# Patient Record
Sex: FEMALE | Race: OTHER
[De-identification: names, ages, dates, MRNs, and addresses within clinical notes are randomized per-mention and may not be internally consistent; named-entity substitution may affect disease eponyms.]

---

## 2017-09-29 ENCOUNTER — HOSPITAL ENCOUNTER (EMERGENCY)
Dept: HOSPITAL 8 - ED | Age: 42
Discharge: HOME | End: 2017-09-29
Payer: MEDICAID

## 2017-09-29 VITALS — HEIGHT: 58 IN | BODY MASS INDEX: 23.14 KG/M2 | WEIGHT: 110.23 LBS

## 2017-09-29 VITALS — DIASTOLIC BLOOD PRESSURE: 63 MMHG | SYSTOLIC BLOOD PRESSURE: 119 MMHG

## 2017-09-29 DIAGNOSIS — I10: ICD-10-CM

## 2017-09-29 DIAGNOSIS — M94.0: Primary | ICD-10-CM

## 2017-09-29 LAB
AST SERPL-CCNC: 17 U/L (ref 15–37)
BUN SERPL-MCNC: 10 MG/DL (ref 7–18)
HCT VFR BLD CALC: 43.9 % (ref 34.6–47.8)
HGB BLD-MCNC: 14.9 G/DL (ref 11.7–16.4)
IS PT STATUS REG ER OR PRE ER?: YES
WBC # BLD AUTO: 6.9 X10^3/UL (ref 3.4–10)

## 2017-09-29 PROCEDURE — 36415 COLL VENOUS BLD VENIPUNCTURE: CPT

## 2017-09-29 PROCEDURE — 84484 ASSAY OF TROPONIN QUANT: CPT

## 2017-09-29 PROCEDURE — 85379 FIBRIN DEGRADATION QUANT: CPT

## 2017-09-29 PROCEDURE — 80053 COMPREHEN METABOLIC PANEL: CPT

## 2017-09-29 PROCEDURE — 93005 ELECTROCARDIOGRAM TRACING: CPT

## 2017-09-29 PROCEDURE — 71010: CPT

## 2017-09-29 PROCEDURE — 99285 EMERGENCY DEPT VISIT HI MDM: CPT

## 2017-09-29 PROCEDURE — 85025 COMPLETE CBC W/AUTO DIFF WBC: CPT

## 2018-08-20 ENCOUNTER — HOSPITAL ENCOUNTER (EMERGENCY)
Dept: HOSPITAL 8 - ED | Age: 43
Discharge: HOME | End: 2018-08-20
Payer: MEDICAID

## 2018-08-20 VITALS — SYSTOLIC BLOOD PRESSURE: 137 MMHG | DIASTOLIC BLOOD PRESSURE: 81 MMHG

## 2018-08-20 VITALS — BODY MASS INDEX: 22.21 KG/M2 | HEIGHT: 58 IN | WEIGHT: 105.82 LBS

## 2018-08-20 DIAGNOSIS — I10: ICD-10-CM

## 2018-08-20 DIAGNOSIS — R51: Primary | ICD-10-CM

## 2018-08-20 DIAGNOSIS — E05.00: ICD-10-CM

## 2018-08-20 LAB
ALBUMIN SERPL-MCNC: 3.4 G/DL (ref 3.4–5)
ALP SERPL-CCNC: 53 U/L (ref 45–117)
ALT SERPL-CCNC: 29 U/L (ref 12–78)
ANION GAP SERPL CALC-SCNC: 10 MMOL/L (ref 5–15)
BASOPHILS # BLD AUTO: 0.03 X10^3/UL (ref 0–0.1)
BASOPHILS NFR BLD AUTO: 0 % (ref 0–1)
BILIRUB SERPL-MCNC: 0.3 MG/DL (ref 0.2–1)
CALCIUM SERPL-MCNC: 8.2 MG/DL (ref 8.5–10.1)
CHLORIDE SERPL-SCNC: 103 MMOL/L (ref 98–107)
CREAT SERPL-MCNC: 0.74 MG/DL (ref 0.55–1.02)
CULTURE INDICATED?: NO
EOSINOPHIL # BLD AUTO: 0.36 X10^3/UL (ref 0–0.4)
EOSINOPHIL NFR BLD AUTO: 3 % (ref 1–7)
ERYTHROCYTE [DISTWIDTH] IN BLOOD BY AUTOMATED COUNT: 13 % (ref 9.6–15.2)
LYMPHOCYTES # BLD AUTO: 2 X10^3/UL (ref 1–3.4)
LYMPHOCYTES NFR BLD AUTO: 16 % (ref 22–44)
MCH RBC QN AUTO: 32.3 PG (ref 27–34.8)
MCHC RBC AUTO-ENTMCNC: 34.8 G/DL (ref 32.4–35.8)
MCV RBC AUTO: 92.9 FL (ref 80–100)
MD: NO
MICROSCOPIC: (no result)
MONOCYTES # BLD AUTO: 0.96 X10^3/UL (ref 0.2–0.8)
MONOCYTES NFR BLD AUTO: 8 % (ref 2–9)
NEUTROPHILS # BLD AUTO: 9.12 X10^3/UL (ref 1.8–6.8)
NEUTROPHILS NFR BLD AUTO: 73 % (ref 42–75)
PLATELET # BLD AUTO: 307 X10^3/UL (ref 130–400)
PMV BLD AUTO: 6.9 FL (ref 7.4–10.4)
PROT SERPL-MCNC: 7.2 G/DL (ref 6.4–8.2)
RBC # BLD AUTO: 4.73 X10^6/UL (ref 3.82–5.3)
TROPONIN I SERPL-MCNC: < 0.015 NG/ML (ref 0–0.04)

## 2018-08-20 PROCEDURE — 81001 URINALYSIS AUTO W/SCOPE: CPT

## 2018-08-20 PROCEDURE — 85025 COMPLETE CBC W/AUTO DIFF WBC: CPT

## 2018-08-20 PROCEDURE — 80053 COMPREHEN METABOLIC PANEL: CPT

## 2018-08-20 PROCEDURE — 99285 EMERGENCY DEPT VISIT HI MDM: CPT

## 2018-08-20 PROCEDURE — 70450 CT HEAD/BRAIN W/O DYE: CPT

## 2018-08-20 PROCEDURE — 96375 TX/PRO/DX INJ NEW DRUG ADDON: CPT

## 2018-08-20 PROCEDURE — 93005 ELECTROCARDIOGRAM TRACING: CPT

## 2018-08-20 PROCEDURE — 71045 X-RAY EXAM CHEST 1 VIEW: CPT

## 2018-08-20 PROCEDURE — 96374 THER/PROPH/DIAG INJ IV PUSH: CPT

## 2018-08-20 PROCEDURE — 36415 COLL VENOUS BLD VENIPUNCTURE: CPT

## 2018-08-20 PROCEDURE — 84484 ASSAY OF TROPONIN QUANT: CPT

## 2018-10-26 ENCOUNTER — OFFICE VISIT (OUTPATIENT)
Dept: URGENT CARE | Facility: CLINIC | Age: 43
End: 2018-10-26
Payer: COMMERCIAL

## 2018-10-26 VITALS
DIASTOLIC BLOOD PRESSURE: 80 MMHG | BODY MASS INDEX: 23.09 KG/M2 | HEART RATE: 82 BPM | SYSTOLIC BLOOD PRESSURE: 110 MMHG | RESPIRATION RATE: 16 BRPM | TEMPERATURE: 98.1 F | HEIGHT: 58 IN | OXYGEN SATURATION: 99 % | WEIGHT: 110 LBS

## 2018-10-26 DIAGNOSIS — H66.002 ACUTE SUPPURATIVE OTITIS MEDIA OF LEFT EAR WITHOUT SPONTANEOUS RUPTURE OF TYMPANIC MEMBRANE, RECURRENCE NOT SPECIFIED: ICD-10-CM

## 2018-10-26 DIAGNOSIS — J01.00 ACUTE MAXILLARY SINUSITIS, RECURRENCE NOT SPECIFIED: Primary | ICD-10-CM

## 2018-10-26 PROCEDURE — 99204 OFFICE O/P NEW MOD 45 MIN: CPT | Performed by: PHYSICIAN ASSISTANT

## 2018-10-26 RX ORDER — AMOXICILLIN AND CLAVULANATE POTASSIUM 875; 125 MG/1; MG/1
1 TABLET, FILM COATED ORAL 2 TIMES DAILY
Qty: 20 TAB | Refills: 0 | Status: SHIPPED | OUTPATIENT
Start: 2018-10-26 | End: 2021-09-21

## 2018-10-27 NOTE — PROGRESS NOTES
Subjective:      Cheryl Sultana is a 43 y.o. female who presents with Sinus Problem (x 2 wks, nasal congestion, face pain, ear pain, headaches, dizziness and post nasal drip)    PMH:  has a past medical history of Thyroid disease.  MEDS:   Current Outpatient Prescriptions:   •  levothyroxine (LEVOXYL) 50 MCG Tab, Take 1 Tab by mouth Every morning on an empty stomach., Disp: 30 Tab, Rfl: 3  •  carvedilol (COREG) 3.125 MG TABS, Take 1 Tab by mouth 2 times a day, with meals., Disp: 60 Tab, Rfl: 3  •  Homeopathic Products SOLN, by Ophthalmic route 3 times a day., Disp: , Rfl:   •  CALCIUM CARBONATE PO, Take  by mouth., Disp: , Rfl:   ALLERGIES: No Known Allergies  SURGHX:   Past Surgical History:   Procedure Laterality Date   • TUBAL COAGULATION LAPAROSCOPIC BILATERAL       SOCHX:  reports that she has never smoked. She has never used smokeless tobacco. She reports that she does not drink alcohol.  FH: family history includes Thyroid in her maternal grandmother, paternal aunt, and paternal aunt. Reviewed with patient/family. Not pertinent to this complaint.            Patient presents with:  Sinus Problem: x 2 wks, nasal congestion, face pain, ear pain, headaches, dizziness and post nasal drip          Sinus Problem   This is a new problem. Episode onset: 2 weeks. The problem has been gradually worsening since onset. There has been no fever. Her pain is at a severity of 7/10. Associated symptoms include congestion, coughing, ear pain, headaches and sinus pressure. Pertinent negatives include no chills. Past treatments include saline sprays (allergy medicine). The treatment provided no relief.       Review of Systems   Constitutional: Negative for chills and fever.   HENT: Positive for congestion, ear pain, sinus pain and sinus pressure.    Respiratory: Positive for cough.    Neurological: Positive for headaches.   All other systems reviewed and are negative.         Objective:     /80 (BP Location: Left  "arm, Patient Position: Sitting, BP Cuff Size: Adult)   Pulse 82   Temp 36.7 °C (98.1 °F) (Temporal)   Resp 16   Ht 1.473 m (4' 9.99\")   Wt 49.9 kg (110 lb)   SpO2 99%   BMI 23.00 kg/m²      Physical Exam   Constitutional: She is oriented to person, place, and time. She appears well-developed and well-nourished. No distress.   HENT:   Head: Normocephalic and atraumatic.   Right Ear: Tympanic membrane normal.   Left Ear: Tympanic membrane is erythematous and retracted. A middle ear effusion is present.   Nose: Nose normal.   Mouth/Throat: Uvula is midline, oropharynx is clear and moist and mucous membranes are normal. No oropharyngeal exudate or posterior oropharyngeal edema.   Eyes: Pupils are equal, round, and reactive to light. Conjunctivae and EOM are normal.   Neck: Normal range of motion. Neck supple.   Cardiovascular: Normal rate and regular rhythm.    Pulmonary/Chest: Effort normal and breath sounds normal. No respiratory distress.   Abdominal: Soft.   Musculoskeletal: Normal range of motion.   Lymphadenopathy:     She has no cervical adenopathy.   Neurological: She is alert and oriented to person, place, and time.   Skin: Skin is warm and dry. Capillary refill takes less than 2 seconds.   Psychiatric: She has a normal mood and affect.   Nursing note and vitals reviewed.         Assessment/Plan:     1. Acute maxillary sinusitis, recurrence not specified  amoxicillin-clavulanate (AUGMENTIN) 875-125 MG Tab   2. Acute suppurative otitis media of left ear without spontaneous rupture of tympanic membrane, recurrence not specified  amoxicillin-clavulanate (AUGMENTIN) 875-125 MG Tab       Motrin/Advil/Ibuprophen 600 mg every 6 hours as needed for pain or fever.    PT should follow up with PCP in 1-2 days for re-evaluation if symptoms have not improved.  Discussed red flags and reasons to return to UC or ED.  Pt and/or family verbalized understanding of diagnosis and follow up instructions and was offered " informational handout on diagnosis.  PT discharged.

## 2018-10-30 ASSESSMENT — ENCOUNTER SYMPTOMS
FEVER: 0
CHILLS: 0
SINUS PRESSURE: 1
SINUS PAIN: 1
HEADACHES: 1
COUGH: 1

## 2019-12-13 ENCOUNTER — HOSPITAL ENCOUNTER (OUTPATIENT)
Dept: HOSPITAL 8 - CFH | Age: 44
Discharge: HOME | End: 2019-12-13
Attending: UROLOGY
Payer: COMMERCIAL

## 2019-12-13 DIAGNOSIS — N83.291: Primary | ICD-10-CM

## 2019-12-13 DIAGNOSIS — K76.89: ICD-10-CM

## 2019-12-13 DIAGNOSIS — K76.0: ICD-10-CM

## 2019-12-13 PROCEDURE — 74178 CT ABD&PLV WO CNTR FLWD CNTR: CPT

## 2020-09-25 ENCOUNTER — HOSPITAL ENCOUNTER (OUTPATIENT)
Dept: RADIOLOGY | Facility: MEDICAL CENTER | Age: 45
End: 2020-09-25
Attending: FAMILY MEDICINE

## 2020-09-25 DIAGNOSIS — R10.2 PAIN, PELVIC, FEMALE: ICD-10-CM

## 2020-11-06 ENCOUNTER — HOSPITAL ENCOUNTER (OUTPATIENT)
Dept: RADIOLOGY | Facility: MEDICAL CENTER | Age: 45
End: 2020-11-06
Attending: FAMILY MEDICINE

## 2020-11-06 DIAGNOSIS — N83.201 RIGHT OVARIAN CYST: ICD-10-CM

## 2021-03-26 ENCOUNTER — HOSPITAL ENCOUNTER (EMERGENCY)
Facility: MEDICAL CENTER | Age: 46
End: 2021-03-27
Attending: EMERGENCY MEDICINE
Payer: COMMERCIAL

## 2021-03-26 ENCOUNTER — APPOINTMENT (OUTPATIENT)
Dept: RADIOLOGY | Facility: MEDICAL CENTER | Age: 46
End: 2021-03-26
Attending: EMERGENCY MEDICINE
Payer: COMMERCIAL

## 2021-03-26 VITALS
SYSTOLIC BLOOD PRESSURE: 171 MMHG | OXYGEN SATURATION: 97 % | HEIGHT: 58 IN | HEART RATE: 80 BPM | WEIGHT: 109.35 LBS | DIASTOLIC BLOOD PRESSURE: 80 MMHG | RESPIRATION RATE: 14 BRPM | BODY MASS INDEX: 22.95 KG/M2 | TEMPERATURE: 98 F

## 2021-03-26 DIAGNOSIS — R51.9 NONINTRACTABLE HEADACHE, UNSPECIFIED CHRONICITY PATTERN, UNSPECIFIED HEADACHE TYPE: ICD-10-CM

## 2021-03-26 LAB
ALBUMIN SERPL BCP-MCNC: 3.9 G/DL (ref 3.2–4.9)
ALBUMIN/GLOB SERPL: 1.3 G/DL
ALP SERPL-CCNC: 54 U/L (ref 30–99)
ALT SERPL-CCNC: 13 U/L (ref 2–50)
ANION GAP SERPL CALC-SCNC: 12 MMOL/L (ref 7–16)
AST SERPL-CCNC: 21 U/L (ref 12–45)
BASOPHILS # BLD AUTO: 0.6 % (ref 0–1.8)
BASOPHILS # BLD: 0.06 K/UL (ref 0–0.12)
BILIRUB SERPL-MCNC: 0.5 MG/DL (ref 0.1–1.5)
BUN SERPL-MCNC: 11 MG/DL (ref 8–22)
CALCIUM SERPL-MCNC: 8.9 MG/DL (ref 8.5–10.5)
CHLORIDE SERPL-SCNC: 103 MMOL/L (ref 96–112)
CO2 SERPL-SCNC: 23 MMOL/L (ref 20–33)
CREAT SERPL-MCNC: 0.72 MG/DL (ref 0.5–1.4)
EOSINOPHIL # BLD AUTO: 0.17 K/UL (ref 0–0.51)
EOSINOPHIL NFR BLD: 1.6 % (ref 0–6.9)
ERYTHROCYTE [DISTWIDTH] IN BLOOD BY AUTOMATED COUNT: 42.8 FL (ref 35.9–50)
ERYTHROCYTE [SEDIMENTATION RATE] IN BLOOD BY WESTERGREN METHOD: 6 MM/HOUR (ref 0–20)
GLOBULIN SER CALC-MCNC: 3.1 G/DL (ref 1.9–3.5)
GLUCOSE SERPL-MCNC: 100 MG/DL (ref 65–99)
HCT VFR BLD AUTO: 48.9 % (ref 37–47)
HGB BLD-MCNC: 16.3 G/DL (ref 12–16)
IMM GRANULOCYTES # BLD AUTO: 0.11 K/UL (ref 0–0.11)
IMM GRANULOCYTES NFR BLD AUTO: 1 % (ref 0–0.9)
LYMPHOCYTES # BLD AUTO: 1.31 K/UL (ref 1–4.8)
LYMPHOCYTES NFR BLD: 12.1 % (ref 22–41)
MCH RBC QN AUTO: 31.3 PG (ref 27–33)
MCHC RBC AUTO-ENTMCNC: 33.3 G/DL (ref 33.6–35)
MCV RBC AUTO: 94 FL (ref 81.4–97.8)
MONOCYTES # BLD AUTO: 0.65 K/UL (ref 0–0.85)
MONOCYTES NFR BLD AUTO: 6 % (ref 0–13.4)
NEUTROPHILS # BLD AUTO: 8.5 K/UL (ref 2–7.15)
NEUTROPHILS NFR BLD: 78.7 % (ref 44–72)
NRBC # BLD AUTO: 0 K/UL
NRBC BLD-RTO: 0 /100 WBC
PLATELET # BLD AUTO: 296 K/UL (ref 164–446)
PMV BLD AUTO: 9.1 FL (ref 9–12.9)
POTASSIUM SERPL-SCNC: 3.9 MMOL/L (ref 3.6–5.5)
PROT SERPL-MCNC: 7 G/DL (ref 6–8.2)
RBC # BLD AUTO: 5.2 M/UL (ref 4.2–5.4)
SODIUM SERPL-SCNC: 138 MMOL/L (ref 135–145)
TROPONIN T SERPL-MCNC: <6 NG/L (ref 6–19)
WBC # BLD AUTO: 10.8 K/UL (ref 4.8–10.8)

## 2021-03-26 PROCEDURE — 85652 RBC SED RATE AUTOMATED: CPT

## 2021-03-26 PROCEDURE — 85025 COMPLETE CBC W/AUTO DIFF WBC: CPT

## 2021-03-26 PROCEDURE — 700111 HCHG RX REV CODE 636 W/ 250 OVERRIDE (IP): Performed by: EMERGENCY MEDICINE

## 2021-03-26 PROCEDURE — 96375 TX/PRO/DX INJ NEW DRUG ADDON: CPT

## 2021-03-26 PROCEDURE — 99284 EMERGENCY DEPT VISIT MOD MDM: CPT

## 2021-03-26 PROCEDURE — 96374 THER/PROPH/DIAG INJ IV PUSH: CPT

## 2021-03-26 PROCEDURE — 700101 HCHG RX REV CODE 250: Performed by: EMERGENCY MEDICINE

## 2021-03-26 PROCEDURE — 80053 COMPREHEN METABOLIC PANEL: CPT

## 2021-03-26 PROCEDURE — 93005 ELECTROCARDIOGRAM TRACING: CPT | Performed by: EMERGENCY MEDICINE

## 2021-03-26 PROCEDURE — 84484 ASSAY OF TROPONIN QUANT: CPT

## 2021-03-26 PROCEDURE — 70450 CT HEAD/BRAIN W/O DYE: CPT

## 2021-03-26 RX ORDER — PROPARACAINE HYDROCHLORIDE 5 MG/ML
1 SOLUTION/ DROPS OPHTHALMIC ONCE
Status: COMPLETED | OUTPATIENT
Start: 2021-03-26 | End: 2021-03-26

## 2021-03-26 RX ORDER — ONDANSETRON 2 MG/ML
4 INJECTION INTRAMUSCULAR; INTRAVENOUS ONCE
Status: COMPLETED | OUTPATIENT
Start: 2021-03-26 | End: 2021-03-26

## 2021-03-26 RX ADMIN — FENTANYL CITRATE 50 MCG: 50 INJECTION, SOLUTION INTRAMUSCULAR; INTRAVENOUS at 21:49

## 2021-03-26 RX ADMIN — PROPARACAINE HYDROCHLORIDE 1 DROP: 5 SOLUTION/ DROPS OPHTHALMIC at 21:45

## 2021-03-26 RX ADMIN — ONDANSETRON 4 MG: 2 INJECTION INTRAMUSCULAR; INTRAVENOUS at 22:53

## 2021-03-27 LAB — EKG IMPRESSION: NORMAL

## 2021-03-27 NOTE — ED PROVIDER NOTES
"ED Provider Note    ER PROVIDER NOTE        CHIEF COMPLAINT  Chief Complaint   Patient presents with   • Headache   • Hypertension       HPI  Cheryl Sultana is a 45 y.o. female who presents to the emergency department complaining of eye redness and headache.  Patient reports that she noticed around a week ago she had \"popped a blood vessel in her eye\", she did not think much of it but yesterday she gradually began to develop some headache, first on the right side of her head although now seems more all over.  It is a pressure sensation which does feel similar to past headaches.  She reports no actual eye pain or visual disturbance, she reports no neck pain or focal weakness numbness or tingling.  No chest pain or shortness of breath.  No abnormal swelling, no abdominal pain, nausea or vomiting.  The states she has not had issues with her blood pressure before    REVIEW OF SYSTEMS  Pertinent positives include headache. Pertinent negatives include no chest pain. See HPI for details. All other systems reviewed and are negative.    PAST MEDICAL HISTORY   has a past medical history of Thyroid disease.    SURGICAL HISTORY   has a past surgical history that includes tubal coagulation laparoscopic bilateral.    FAMILY HISTORY  Family History   Problem Relation Age of Onset   • Thyroid Paternal Aunt         hyperthyroidism   • Thyroid Paternal Aunt         hyperthyroidism   • Thyroid Maternal Grandmother         goiter       SOCIAL HISTORY  Social History     Socioeconomic History   • Marital status:      Spouse name: Not on file   • Number of children: Not on file   • Years of education: Not on file   • Highest education level: Not on file   Occupational History   • Not on file   Tobacco Use   • Smoking status: Never Smoker   • Smokeless tobacco: Never Used   Substance and Sexual Activity   • Alcohol use: No   • Drug use: Not on file   • Sexual activity: Not on file   Other Topics Concern   • Not on file " "  Social History Narrative   • Not on file     Social Determinants of Health     Financial Resource Strain:    • Difficulty of Paying Living Expenses:    Food Insecurity:    • Worried About Running Out of Food in the Last Year:    • Ran Out of Food in the Last Year:    Transportation Needs:    • Lack of Transportation (Medical):    • Lack of Transportation (Non-Medical):    Physical Activity:    • Days of Exercise per Week:    • Minutes of Exercise per Session:    Stress:    • Feeling of Stress :    Social Connections:    • Frequency of Communication with Friends and Family:    • Frequency of Social Gatherings with Friends and Family:    • Attends Church Services:    • Active Member of Clubs or Organizations:    • Attends Club or Organization Meetings:    • Marital Status:    Intimate Partner Violence:    • Fear of Current or Ex-Partner:    • Emotionally Abused:    • Physically Abused:    • Sexually Abused:       Social History     Substance and Sexual Activity   Drug Use Not on file       CURRENT MEDICATIONS  Home Medications    **Home medications have not yet been reviewed for this encounter**         ALLERGIES  No Known Allergies    PHYSICAL EXAM  VITAL SIGNS: BP (!) 171/80   Pulse 80   Temp 36.7 °C (98 °F) (Temporal)   Resp 14   Ht 1.473 m (4' 10\")   Wt 49.6 kg (109 lb 5.6 oz)   SpO2 97%   BMI 22.85 kg/m²   Pulse ox interpretation: I interpret this pulse ox as normal.    Constitutional: Alert in no apparent distress.  HENT: No signs of trauma, Bilateral external ears normal, Nose normal.   Eyes: Pupils are equal and reactive, Conjunctiva normal, Non-icteric.  There is slight scleral injection on the right  Neck: Normal range of motion, No tenderness, Supple, No stridor.   Lymphatic: No lymphadenopathy noted.   Cardiovascular: Regular rate and rhythm, no murmurs.   Thorax & Lungs: Normal breath sounds, No respiratory distress, No wheezing, No chest tenderness.   Abdomen: Bowel sounds normal, Soft, No " tenderness, No masses, No pulsatile masses. No peritoneal signs.  Skin: Warm, Dry, No erythema, No rash.   Back: No bony tenderness, No CVA tenderness.   Extremities: Intact distal pulses, No edema, No tenderness, No cyanosis, Negative Grace's sign.  Musculoskeletal: Good range of motion in all major joints. No tenderness to palpation or major deformities noted.   Neurologic: Alert, cranial nerves intact, speech is appropriate or not slurred, upper extremities bilaterally exhibit no drift, no dysmetria, 5 out of 5 strength with bilateral bicep/tricep/, sensation intact to light touch throughout upper extremities. Lower extremities strength 5 out of 5 thigh extension/flexion/abduction/adduction, knee extension/flexion, dorsiflexion plantar flexion. No clonus.  2+ patella reflexes.  sensation intact to light touch.  No focal deficits noted. Ambulates with steady gait, steady tandem gait  Psychiatric: Affect normal, Judgment normal, Mood normal.       DIAGNOSTIC STUDIES / PROCEDURES    EKG  Results for orders placed or performed during the hospital encounter of 03/26/21   CBC WITH DIFFERENTIAL   Result Value Ref Range    WBC 10.8 4.8 - 10.8 K/uL    RBC 5.20 4.20 - 5.40 M/uL    Hemoglobin 16.3 (H) 12.0 - 16.0 g/dL    Hematocrit 48.9 (H) 37.0 - 47.0 %    MCV 94.0 81.4 - 97.8 fL    MCH 31.3 27.0 - 33.0 pg    MCHC 33.3 (L) 33.6 - 35.0 g/dL    RDW 42.8 35.9 - 50.0 fL    Platelet Count 296 164 - 446 K/uL    MPV 9.1 9.0 - 12.9 fL    Neutrophils-Polys 78.70 (H) 44.00 - 72.00 %    Lymphocytes 12.10 (L) 22.00 - 41.00 %    Monocytes 6.00 0.00 - 13.40 %    Eosinophils 1.60 0.00 - 6.90 %    Basophils 0.60 0.00 - 1.80 %    Immature Granulocytes 1.00 (H) 0.00 - 0.90 %    Nucleated RBC 0.00 /100 WBC    Neutrophils (Absolute) 8.50 (H) 2.00 - 7.15 K/uL    Lymphs (Absolute) 1.31 1.00 - 4.80 K/uL    Monos (Absolute) 0.65 0.00 - 0.85 K/uL    Eos (Absolute) 0.17 0.00 - 0.51 K/uL    Baso (Absolute) 0.06 0.00 - 0.12 K/uL    Immature  Granulocytes (abs) 0.11 0.00 - 0.11 K/uL    NRBC (Absolute) 0.00 K/uL   COMP METABOLIC PANEL   Result Value Ref Range    Sodium 138 135 - 145 mmol/L    Potassium 3.9 3.6 - 5.5 mmol/L    Chloride 103 96 - 112 mmol/L    Co2 23 20 - 33 mmol/L    Anion Gap 12.0 7.0 - 16.0    Glucose 100 (H) 65 - 99 mg/dL    Bun 11 8 - 22 mg/dL    Creatinine 0.72 0.50 - 1.40 mg/dL    Calcium 8.9 8.5 - 10.5 mg/dL    AST(SGOT) 21 12 - 45 U/L    ALT(SGPT) 13 2 - 50 U/L    Alkaline Phosphatase 54 30 - 99 U/L    Total Bilirubin 0.5 0.1 - 1.5 mg/dL    Albumin 3.9 3.2 - 4.9 g/dL    Total Protein 7.0 6.0 - 8.2 g/dL    Globulin 3.1 1.9 - 3.5 g/dL    A-G Ratio 1.3 g/dL   TROPONIN   Result Value Ref Range    Troponin T <6 6 - 19 ng/L   Sed Rate   Result Value Ref Range    Sed Rate Westergren 6 0 - 20 mm/hour   ESTIMATED GFR   Result Value Ref Range    GFR If African American >60 >60 mL/min/1.73 m 2    GFR If Non African American >60 >60 mL/min/1.73 m 2   EKG   Result Value Ref Range    Report       [facility] Emergency Dept.    Test Date:  2021  Pt Name:    MATT MALDONADO          Department: ER  MRN:        [id number]                      Room:       [facility]  Gender:     Female                       Technician: [id number]  :        [id number]                   Requested By:GALE HUMPHREYS  Order #:    [id number]                    Reading MD: GALE HUMPHREYS MD    Measurements  Intervals                                Axis  Rate:       71                           P:          68  TX:         144                          QRS:        42  QRSD:       80                           T:          31  QT:         400  QTc:        435    Interpretive Statements  SINUS RHYTHM  No previous ECG available for comparison  Electronically Signed On 3- 0:14:00 PDT by GALE HUMPHREYS MD           RADIOLOGY  CT-HEAD W/O   Final Result      No acute intracranial abnormality.        The radiologist's interpretation of all radiological  studies have been reviewed and images independently viewed by me.    COURSE & MEDICAL DECISION MAKING  Nursing notes, VS, PMSFHx reviewed in chart.    9:15 PM Patient seen and examined at bedside. Patient will be treated with fentanyl. Ordered for CBC, CMP, troponin, ECG, CT head, sed rate, additionally ordered for proparacaine and Prasanth-Pen to evaluate her symptoms.     Intraocular pressures checked, 17 on the right    11:17 PM  Patient is reevaluated, she is feeling improved. discussed results and plan for discharge to which the patient is agreeable          Decision Making:  This is a 45 y.o. female presenting with headaches and elevated blood pressure. The lack of rapid onset and severity of the headache, in addition to the well appearance of the pt makes the dx of subarchnoid hemorrhage less likely. The normal vital signs, lack of a temperature and lack of meningeal signs (supple neck without pain with rom) makes the dx of meningitis less likely. The patient has no neurologic signs, no fever, and is immunocompetent therefore the time course would be inconsistent with abscess. The absence of neurologic signs and the short duration of sx make neoplasm unlikely.  Her CT shows no evidence of intracranial bleed or other abnormality at this point.  Her age, normal sed rate  make a temporal arteritis unlikely, intraocular pressure unlikely to be glaucoma (no vision change, perrla on exam), sinusitis (no sinus tenderness), pseudotumor cerebri, dural venous thrombosis, and cluster headache (headache duration and character are inconsistent with this diagnosis).  Patient's blood pressure has improved as well as her headache, whether she has undiagnosed hypertension is somewhat unclear at this point as she is a never had elevated blood pressure before.  She will monitor her blood pressure over the next week and follow-up with her primary care doctor for this and potential institution of medication.  No additional work-up.   There is no evidence of endorgan damage with normal ECG, troponin and creatinine as well as no evidence of microangiopathic hemolytic anemia    The patient is improved with normal vitals, a normal neurologic exam, normal gait, and is discharged home with pcp follow-up and return precautions.         The patient will return for new or worsening symptoms and is stable at the time of discharge.    The patient is referred to a primary physician for blood pressure management, diabetic screening, and for all other preventative health concerns.      DISPOSITION:  Patient will be discharged home in stable condition.    FOLLOW UP:  LYNDA Nesbitt  [address] [place] [id number]  [phone]    In 1 week        OUTPATIENT MEDICATIONS:  Discharge Medication List as of 3/26/2021 11:53 PM            FINAL IMPRESSION  1. Nonintractable headache, unspecified chronicity pattern, unspecified headache type         The note accurately reflects work and decisions made by me.  Duane Ramírez M.D.  3/27/2021  12:17 AM

## 2021-03-27 NOTE — ED NOTES
Pt ambulated to red 8. Instructed to change into gown and connected to monitoring equipment. Waiting for ERP to see.

## 2021-03-27 NOTE — ED TRIAGE NOTES
"Chief Complaint   Patient presents with   • Headache   • Hypertension     44 yo female ambulatory to triage for above complaint. Pt reports general HA x 1 wk and reports increasing intensity at approx. 0900 this AM, reports 9/10 generalized head throbbing with nausea. Denies blood thinners. Stroke scale -, AOx4, GCS 15. Hypertensive in triage, not on medication.     Educated on triage process, encourage to inform staff of any changes. Charge RN notified.     BP (!) 198/113   Pulse 91   Temp 36.8 °C (98.3 °F) (Temporal)   Resp 14   Ht 1.473 m (4' 10\")   Wt 49.6 kg (109 lb 5.6 oz)   SpO2 99%   BMI 22.85 kg/m²   "

## 2021-08-22 ENCOUNTER — HOSPITAL ENCOUNTER (OUTPATIENT)
Dept: RADIOLOGY | Facility: MEDICAL CENTER | Age: 46
End: 2021-08-22
Attending: NURSE PRACTITIONER
Payer: COMMERCIAL

## 2021-08-22 ENCOUNTER — HOSPITAL ENCOUNTER (OUTPATIENT)
Dept: RADIOLOGY | Facility: MEDICAL CENTER | Age: 46
End: 2021-08-22
Payer: COMMERCIAL

## 2021-08-22 DIAGNOSIS — R10.9 LEFT SIDED ABDOMINAL PAIN: ICD-10-CM

## 2021-09-13 ENCOUNTER — HOSPITAL ENCOUNTER (EMERGENCY)
Facility: MEDICAL CENTER | Age: 46
End: 2021-09-13
Attending: EMERGENCY MEDICINE
Payer: COMMERCIAL

## 2021-09-13 ENCOUNTER — APPOINTMENT (OUTPATIENT)
Dept: RADIOLOGY | Facility: MEDICAL CENTER | Age: 46
End: 2021-09-13
Attending: EMERGENCY MEDICINE
Payer: COMMERCIAL

## 2021-09-13 VITALS
SYSTOLIC BLOOD PRESSURE: 116 MMHG | HEART RATE: 100 BPM | HEIGHT: 58 IN | DIASTOLIC BLOOD PRESSURE: 80 MMHG | BODY MASS INDEX: 21.24 KG/M2 | TEMPERATURE: 100.2 F | OXYGEN SATURATION: 96 % | RESPIRATION RATE: 18 BRPM | WEIGHT: 101.19 LBS

## 2021-09-13 DIAGNOSIS — R74.8 ELEVATED LIPASE: ICD-10-CM

## 2021-09-13 DIAGNOSIS — U07.1 COVID-19: ICD-10-CM

## 2021-09-13 LAB
ALBUMIN SERPL BCP-MCNC: 3 G/DL (ref 3.2–4.9)
ALBUMIN/GLOB SERPL: 0.9 G/DL
ALP SERPL-CCNC: 42 U/L (ref 30–99)
ALT SERPL-CCNC: 20 U/L (ref 2–50)
ANION GAP SERPL CALC-SCNC: 9 MMOL/L (ref 7–16)
APPEARANCE UR: CLEAR
AST SERPL-CCNC: 41 U/L (ref 12–45)
BASOPHILS # BLD AUTO: 0 % (ref 0–1.8)
BASOPHILS # BLD: 0 K/UL (ref 0–0.12)
BILIRUB SERPL-MCNC: 0.3 MG/DL (ref 0.1–1.5)
BILIRUB UR QL STRIP.AUTO: NEGATIVE
BUN SERPL-MCNC: 10 MG/DL (ref 8–22)
CALCIUM SERPL-MCNC: 8 MG/DL (ref 8.5–10.5)
CHLORIDE SERPL-SCNC: 100 MMOL/L (ref 96–112)
CO2 SERPL-SCNC: 25 MMOL/L (ref 20–33)
COLOR UR: YELLOW
CREAT SERPL-MCNC: 0.98 MG/DL (ref 0.5–1.4)
EOSINOPHIL # BLD AUTO: 0 K/UL (ref 0–0.51)
EOSINOPHIL NFR BLD: 0 % (ref 0–6.9)
EPI CELLS #/AREA URNS HPF: ABNORMAL /HPF
ERYTHROCYTE [DISTWIDTH] IN BLOOD BY AUTOMATED COUNT: 42.3 FL (ref 35.9–50)
FLUAV RNA SPEC QL NAA+PROBE: NEGATIVE
FLUBV RNA SPEC QL NAA+PROBE: NEGATIVE
GLOBULIN SER CALC-MCNC: 3.2 G/DL (ref 1.9–3.5)
GLUCOSE SERPL-MCNC: 95 MG/DL (ref 65–99)
GLUCOSE UR STRIP.AUTO-MCNC: NEGATIVE MG/DL
HCT VFR BLD AUTO: 46.3 % (ref 37–47)
HGB BLD-MCNC: 15.5 G/DL (ref 12–16)
KETONES UR STRIP.AUTO-MCNC: NEGATIVE MG/DL
LEUKOCYTE ESTERASE UR QL STRIP.AUTO: NEGATIVE
LIPASE SERPL-CCNC: 97 U/L (ref 11–82)
LYMPHOCYTES # BLD AUTO: 0.41 K/UL (ref 1–4.8)
LYMPHOCYTES NFR BLD: 10.5 % (ref 22–41)
MANUAL DIFF BLD: NORMAL
MCH RBC QN AUTO: 30.9 PG (ref 27–33)
MCHC RBC AUTO-ENTMCNC: 33.5 G/DL (ref 33.6–35)
MCV RBC AUTO: 92.2 FL (ref 81.4–97.8)
MICRO URNS: ABNORMAL
MONOCYTES # BLD AUTO: 0.24 K/UL (ref 0–0.85)
MONOCYTES NFR BLD AUTO: 6.2 % (ref 0–13.4)
MORPHOLOGY BLD-IMP: NORMAL
NEUTROPHILS # BLD AUTO: 3.25 K/UL (ref 2–7.15)
NEUTROPHILS NFR BLD: 80.7 % (ref 44–72)
NEUTS BAND NFR BLD MANUAL: 2.6 % (ref 0–10)
NITRITE UR QL STRIP.AUTO: NEGATIVE
NRBC # BLD AUTO: 0 K/UL
NRBC BLD-RTO: 0 /100 WBC
PH UR STRIP.AUTO: 5.5 [PH] (ref 5–8)
PLATELET # BLD AUTO: 142 K/UL (ref 164–446)
PLATELET BLD QL SMEAR: NORMAL
PMV BLD AUTO: 9.8 FL (ref 9–12.9)
POTASSIUM SERPL-SCNC: 3.8 MMOL/L (ref 3.6–5.5)
PROT SERPL-MCNC: 6.2 G/DL (ref 6–8.2)
PROT UR QL STRIP: 300 MG/DL
RBC # BLD AUTO: 5.02 M/UL (ref 4.2–5.4)
RBC # URNS HPF: ABNORMAL /HPF
RBC BLD AUTO: NORMAL
RBC UR QL AUTO: ABNORMAL
RSV RNA SPEC QL NAA+PROBE: NEGATIVE
SARS-COV-2 RNA RESP QL NAA+PROBE: DETECTED
SODIUM SERPL-SCNC: 134 MMOL/L (ref 135–145)
SP GR UR STRIP.AUTO: 1.01
SPECIMEN SOURCE: ABNORMAL
UROBILINOGEN UR STRIP.AUTO-MCNC: 0.2 MG/DL
WBC # BLD AUTO: 3.9 K/UL (ref 4.8–10.8)
WBC #/AREA URNS HPF: ABNORMAL /HPF

## 2021-09-13 PROCEDURE — C9803 HOPD COVID-19 SPEC COLLECT: HCPCS | Performed by: EMERGENCY MEDICINE

## 2021-09-13 PROCEDURE — 85027 COMPLETE CBC AUTOMATED: CPT

## 2021-09-13 PROCEDURE — 99284 EMERGENCY DEPT VISIT MOD MDM: CPT

## 2021-09-13 PROCEDURE — 83690 ASSAY OF LIPASE: CPT

## 2021-09-13 PROCEDURE — 700102 HCHG RX REV CODE 250 W/ 637 OVERRIDE(OP): Performed by: EMERGENCY MEDICINE

## 2021-09-13 PROCEDURE — 71045 X-RAY EXAM CHEST 1 VIEW: CPT

## 2021-09-13 PROCEDURE — 85007 BL SMEAR W/DIFF WBC COUNT: CPT

## 2021-09-13 PROCEDURE — 0241U HCHG SARS-COV-2 COVID-19 NFCT DS RESP RNA 4 TRGT MIC: CPT

## 2021-09-13 PROCEDURE — A9270 NON-COVERED ITEM OR SERVICE: HCPCS | Performed by: EMERGENCY MEDICINE

## 2021-09-13 PROCEDURE — 80053 COMPREHEN METABOLIC PANEL: CPT

## 2021-09-13 PROCEDURE — 81001 URINALYSIS AUTO W/SCOPE: CPT

## 2021-09-13 RX ORDER — BENZONATATE 100 MG/1
100 CAPSULE ORAL ONCE
Status: COMPLETED | OUTPATIENT
Start: 2021-09-13 | End: 2021-09-13

## 2021-09-13 RX ORDER — BENZONATATE 100 MG/1
100 CAPSULE ORAL 3 TIMES DAILY PRN
Qty: 30 CAPSULE | Refills: 0 | Status: SHIPPED | OUTPATIENT
Start: 2021-09-13 | End: 2021-09-21

## 2021-09-13 RX ORDER — IBUPROFEN 600 MG/1
600 TABLET ORAL ONCE
Status: COMPLETED | OUTPATIENT
Start: 2021-09-13 | End: 2021-09-13

## 2021-09-13 RX ORDER — ACETAMINOPHEN 325 MG/1
650 TABLET ORAL ONCE
Status: COMPLETED | OUTPATIENT
Start: 2021-09-13 | End: 2021-09-13

## 2021-09-13 RX ADMIN — BENZONATATE 100 MG: 100 CAPSULE ORAL at 20:54

## 2021-09-13 RX ADMIN — IBUPROFEN 600 MG: 600 TABLET ORAL at 20:54

## 2021-09-13 RX ADMIN — ACETAMINOPHEN 650 MG: 325 TABLET, FILM COATED ORAL at 20:54

## 2021-09-13 ASSESSMENT — FIBROSIS 4 INDEX: FIB4 SCORE: 0.94

## 2021-09-14 NOTE — DISCHARGE INSTRUCTIONS
You were seen in the ER for fever and cough.  Unfortunately, you are positive for COVID-19.  I would like you to take Tylenol and Motrin as directed on the bottle for fever.  You should drink at least 8 ounces of clear liquids every hour to maintain your hydration.  Your pancreatic enzyme today was a little high as we discussed in the ER.  This is likely due to inflammation from Covid but because you are not having abdominal pain, nausea, or vomiting you do not require hospitalization.  If you do develop any new or worsening symptoms I would like you to return immediately to the ER.  You can purchase a pulse oximeter via the Internet or at a pharmacy and if your oxygen drops into the 80s for longer than 5 minutes you should return to the ER.  Follow-up with your primary care physician but you do need to quarantine for the next 10 days and until your symptoms/fever resolved.  Good luck, I hope you feel better soon!

## 2021-09-14 NOTE — ED PROVIDER NOTES
ED Provider Note    CHIEF COMPLAINT  Chief Complaint   Patient presents with   • Cough     x2 weeks. Pt states she is having SOB because she coughs with deep breathe. Pt was exposed to COVID grandsons but was sick prior to exposure.    • Fever     x2 weeks and taking ibuprofen. Unsure of temp.   • Diarrhea     x couple days. pt denies appitite. Denies n/v.       HPI  Cheryl Sultana is a 46 y.o. female who presents with a chief complaint of nonproductive cough for the past 2-1/2 weeks.  She is unvaccinated against COVID-19 and was exposed to Covid by her grandchild.  She notes a fever as high as 102 degrees.  She has been taking ibuprofen without significant improvement.  She also admits to some nonbloody, watery diarrhea for the past 2 days.  She has a decreased appetite but denies any nausea or vomiting.  She has no chest pain but does have shortness of breath.  No hemoptysis or leg swelling.    REVIEW OF SYSTEMS  See HPI for further details.  Cough.  Fever.  Diarrhea.  Decreased appetite.  All other systems are negative.     PAST MEDICAL HISTORY   has a past medical history of Thyroid disease.    SOCIAL HISTORY  Social History     Tobacco Use   • Smoking status: Never Smoker   • Smokeless tobacco: Never Used   Substance and Sexual Activity   • Alcohol use: No   • Drug use: Not on file   • Sexual activity: Not on file       SURGICAL HISTORY   has a past surgical history that includes tubal coagulation laparoscopic bilateral.    CURRENT MEDICATIONS  Home Medications     Reviewed by Loreto Huang R.N. (Registered Nurse) on 09/13/21 at 1901  Med List Status: Partial   Medication Last Dose Status   amoxicillin-clavulanate (AUGMENTIN) 875-125 MG Tab  Active   CALCIUM CARBONATE PO  Active   carvedilol (COREG) 3.125 MG TABS  Active   Homeopathic Products SOLN  Active   levothyroxine (LEVOXYL) 50 MCG Tab  Active                ALLERGIES  No Known Allergies    PHYSICAL EXAM  VITAL SIGNS: /93   Pulse (!)  "116   Temp (!) 38.8 °C (101.9 °F) (Oral)   Resp 20   Ht 1.473 m (4' 10\")   Wt 45.9 kg (101 lb 3.1 oz)   SpO2 94%   BMI 21.15 kg/m²    Pulse ox interpretation: I interpret this pulse ox as normal.  Constitutional: Alert in no apparent distress.  HENT: No signs of trauma, Bilateral external ears normal, Nose normal.  Dry mucous membranes.  Eyes: Pupils are equal and reactive, Conjunctiva normal, Non-icteric.   Neck: Normal range of motion, No tenderness, Supple, No stridor.   Lymphatic: No lymphadenopathy noted.   Cardiovascular: Tachycardic with regular rhythm, no murmurs. Pulses symmetrical.  Thorax & Lungs: Normal breath sounds, No respiratory distress, No wheezing. Persistent dry cough.  Abdomen: Bowel sounds normal, Soft, No tenderness, No masses, No pulsatile masses. No peritoneal signs.  Skin: Warm, Dry, No erythema, No rash.   Back: Normal alignment.  Extremities: Intact distal pulses, No edema, No tenderness, No cyanosis.  Musculoskeletal: Good range of motion in all major joints. No tenderness to palpation or major deformities noted.   Neurologic: Alert, Normal motor function, Normal sensory function, No focal deficits noted.   Psychiatric: Affect normal, Judgment normal, Mood normal.     DIAGNOSTIC STUDIES / PROCEDURES    LABS  Results for orders placed or performed during the hospital encounter of 09/13/21   CBC WITH DIFFERENTIAL   Result Value Ref Range    WBC 3.9 (L) 4.8 - 10.8 K/uL    RBC 5.02 4.20 - 5.40 M/uL    Hemoglobin 15.5 12.0 - 16.0 g/dL    Hematocrit 46.3 37.0 - 47.0 %    MCV 92.2 81.4 - 97.8 fL    MCH 30.9 27.0 - 33.0 pg    MCHC 33.5 (L) 33.6 - 35.0 g/dL    RDW 42.3 35.9 - 50.0 fL    Platelet Count 142 (L) 164 - 446 K/uL    MPV 9.8 9.0 - 12.9 fL    Neutrophils-Polys 80.70 (H) 44.00 - 72.00 %    Lymphocytes 10.50 (L) 22.00 - 41.00 %    Monocytes 6.20 0.00 - 13.40 %    Eosinophils 0.00 0.00 - 6.90 %    Basophils 0.00 0.00 - 1.80 %    Nucleated RBC 0.00 /100 WBC    Neutrophils (Absolute) " 3.25 2.00 - 7.15 K/uL    Lymphs (Absolute) 0.41 (L) 1.00 - 4.80 K/uL    Monos (Absolute) 0.24 0.00 - 0.85 K/uL    Eos (Absolute) 0.00 0.00 - 0.51 K/uL    Baso (Absolute) 0.00 0.00 - 0.12 K/uL    NRBC (Absolute) 0.00 K/uL   Comp Metabolic Panel   Result Value Ref Range    Sodium 134 (L) 135 - 145 mmol/L    Potassium 3.8 3.6 - 5.5 mmol/L    Chloride 100 96 - 112 mmol/L    Co2 25 20 - 33 mmol/L    Anion Gap 9.0 7.0 - 16.0    Glucose 95 65 - 99 mg/dL    Bun 10 8 - 22 mg/dL    Creatinine 0.98 0.50 - 1.40 mg/dL    Calcium 8.0 (L) 8.5 - 10.5 mg/dL    AST(SGOT) 41 12 - 45 U/L    ALT(SGPT) 20 2 - 50 U/L    Alkaline Phosphatase 42 30 - 99 U/L    Total Bilirubin 0.3 0.1 - 1.5 mg/dL    Albumin 3.0 (L) 3.2 - 4.9 g/dL    Total Protein 6.2 6.0 - 8.2 g/dL    Globulin 3.2 1.9 - 3.5 g/dL    A-G Ratio 0.9 g/dL   LIPASE   Result Value Ref Range    Lipase 97 (H) 11 - 82 U/L   URINALYSIS,CULTURE IF INDICATED    Specimen: Urine, Clean Catch   Result Value Ref Range    Color Yellow     Character Clear     Specific Gravity 1.007 <1.035    Ph 5.5 5.0 - 8.0    Glucose Negative Negative mg/dL    Ketones Negative Negative mg/dL    Protein 300 (A) Negative mg/dL    Bilirubin Negative Negative    Urobilinogen, Urine 0.2 Negative    Nitrite Negative Negative    Leukocyte Esterase Negative Negative    Occult Blood Moderate (A) Negative    Micro Urine Req Microscopic    COV-2, FLU A/B, AND RSV BY PCR (2-4 HOURS CEPHEID): Collect NP swab in VTM    Specimen: Nasopharyngeal; Respirate   Result Value Ref Range    Influenza virus A RNA Negative Negative    Influenza virus B, PCR Negative Negative    RSV, PCR Negative Negative    SARS-CoV-2 by PCR DETECTED (AA)     SARS-CoV-2 Source NP Swab    ESTIMATED GFR   Result Value Ref Range    GFR If African American >60 >60 mL/min/1.73 m 2    GFR If Non African American >60 >60 mL/min/1.73 m 2   DIFFERENTIAL MANUAL   Result Value Ref Range    Bands-Stabs 2.60 0.00 - 10.00 %    Manual Diff Status PERFORMED     PERIPHERAL SMEAR REVIEW   Result Value Ref Range    Peripheral Smear Review see below    PLATELET ESTIMATE   Result Value Ref Range    Plt Estimation Decreased    MORPHOLOGY   Result Value Ref Range    RBC Morphology Normal    URINE MICROSCOPIC (W/UA)   Result Value Ref Range    WBC 2-5 /hpf    RBC 10-20 (A) /hpf    Epithelial Cells Moderate (A) /hpf     RADIOLOGY  DX-CHEST-PORTABLE (1 VIEW)   Final Result         1.  Changes favoring bibasilar atelectasis, early infiltrate not definitively excluded.        COURSE & MEDICAL DECISION MAKING  Pertinent Labs & Imaging studies reviewed. (See chart for details)  This is a 46-year-old female, unvaccinated against COVID-19 but exposed by a family member, who is here with fever, cough, and shortness of breath.  She arrives febrile and tachycardic with O2 sats in the high 90s on room air.  She appears well-hydrated and nontoxic.  Lungs are clear she has no obvious murmurs or rubs on cardiac auscultation.  Abdomen is soft and nontender.  She does have some nonbloody diarrhea as well but no abdominal pain, nausea, or vomiting.    Patient was given a dose of Tylenol and ibuprofen as well as Tessalon Perles.  She was encouraged to orally hydrate.    CBC demonstrates leukopenia with white blood cell count of 3.9 and neutrophilic predominance.  Metabolic panel demonstrates mild hyponatremia to 134 with calcium of 8.  She has normal renal and liver function.  Lipase is slightly elevated to 97.  Patient has no abdominal pain or tenderness.  No vomiting.  This is likely very mild inflammation from Covid.  UA does not suggest infection.  Unfortunately, her Covid swab is positive.    Patient was reevaluated at bedside.  Her heart rate has improved with antipyretics.  She is tolerating p.o.  She did get some relief with the Tessalon Perles from her cough.  She is safe for discharge with close outpatient management.    Patient was given a prescription for Tessalon Perles.  She was  instructed to take Tylenol and Motrin as directed on the bottle.  She was encouraged to drink at least 8 ounces of clear liquids every hour.  I recommended she purchase a pulse oximeter and return to the ER if her oxygen drops into the 80s for greater than 5 minutes.  She was encouraged to return to the ER if any of her symptoms worsen.  Her O2 sats remained in the high 90s on room air.  She is in no respiratory distress whatsoever.  She is safe for discharge with close outpatient management.    The patient will not drink alcohol nor drive with prescribed medications. The patient will return for worsening symptoms and is stable at the time of discharge. The patient verbalizes understanding and will comply.    FINAL IMPRESSION  1. COVID-19     2. Elevated lipase           Electronically signed by: Jose Carlos Tellez M.D., 9/13/2021 8:47 PM

## 2021-09-14 NOTE — ED TRIAGE NOTES
"Chief Complaint   Patient presents with   • Cough     x2 weeks. Pt states she is having SOB because she coughs with deep breathe. Pt was exposed to COVID grandsons but was sick prior to exposure.    • Fever     x2 weeks and taking ibuprofen. Unsure of temp.   • Diarrhea     x couple days. pt denies appitite. Denies n/v.     Pt ambulated back to room for triaged complaint. GCS 15. NAD. VSS on RA.    Pt states she can only taste sweets and cannot smell. Pt is unvaccinated against COVID.      Pt returned to [facility]. Pt educated on triage process. Pt understands to notify staff of any changes or worsening of symptoms.    /93   Pulse (!) 116   Temp (!) 38.8 °C (101.9 °F) (Oral)   Resp 20   Ht 1.473 m (4' 10\")   Wt 45.9 kg (101 lb 3.1 oz)   SpO2 94%   BMI 21.15 kg/m²     "

## 2021-09-14 NOTE — ED NOTES
Discharge instructions given to patient, prescriptions provided, a verbal understanding of all instructions was stated. Pt preferred to walk out accompanied by self VSS,  all belongings accounted for. Pt instructed on self care.

## 2021-09-17 ENCOUNTER — APPOINTMENT (OUTPATIENT)
Dept: RADIOLOGY | Facility: MEDICAL CENTER | Age: 46
End: 2021-09-17
Attending: EMERGENCY MEDICINE
Payer: COMMERCIAL

## 2021-09-17 ENCOUNTER — HOSPITAL ENCOUNTER (EMERGENCY)
Facility: MEDICAL CENTER | Age: 46
End: 2021-09-17
Attending: EMERGENCY MEDICINE
Payer: COMMERCIAL

## 2021-09-17 VITALS
WEIGHT: 100.75 LBS | HEART RATE: 106 BPM | TEMPERATURE: 97.9 F | OXYGEN SATURATION: 91 % | DIASTOLIC BLOOD PRESSURE: 66 MMHG | RESPIRATION RATE: 18 BRPM | SYSTOLIC BLOOD PRESSURE: 114 MMHG | HEIGHT: 58 IN | BODY MASS INDEX: 21.15 KG/M2

## 2021-09-17 DIAGNOSIS — J12.82 PNEUMONIA DUE TO COVID-19 VIRUS: ICD-10-CM

## 2021-09-17 DIAGNOSIS — U07.1 PNEUMONIA DUE TO COVID-19 VIRUS: ICD-10-CM

## 2021-09-17 LAB
ANION GAP SERPL CALC-SCNC: 9 MMOL/L (ref 7–16)
ANISOCYTOSIS BLD QL SMEAR: ABNORMAL
BASOPHILS # BLD AUTO: 0 % (ref 0–1.8)
BASOPHILS # BLD: 0 K/UL (ref 0–0.12)
BUN SERPL-MCNC: 12 MG/DL (ref 8–22)
CALCIUM SERPL-MCNC: 7.7 MG/DL (ref 8.5–10.5)
CHLORIDE SERPL-SCNC: 104 MMOL/L (ref 96–112)
CO2 SERPL-SCNC: 24 MMOL/L (ref 20–33)
CREAT SERPL-MCNC: 0.9 MG/DL (ref 0.5–1.4)
EOSINOPHIL # BLD AUTO: 0 K/UL (ref 0–0.51)
EOSINOPHIL NFR BLD: 0 % (ref 0–6.9)
ERYTHROCYTE [DISTWIDTH] IN BLOOD BY AUTOMATED COUNT: 43.8 FL (ref 35.9–50)
GLUCOSE SERPL-MCNC: 92 MG/DL (ref 65–99)
HCT VFR BLD AUTO: 44.2 % (ref 37–47)
HGB BLD-MCNC: 14.8 G/DL (ref 12–16)
LYMPHOCYTES # BLD AUTO: 0.48 K/UL (ref 1–4.8)
LYMPHOCYTES NFR BLD: 14 % (ref 22–41)
MANUAL DIFF BLD: NORMAL
MCH RBC QN AUTO: 30.9 PG (ref 27–33)
MCHC RBC AUTO-ENTMCNC: 33.5 G/DL (ref 33.6–35)
MCV RBC AUTO: 92.3 FL (ref 81.4–97.8)
METAMYELOCYTES NFR BLD MANUAL: 0.9 %
MICROCYTES BLD QL SMEAR: ABNORMAL
MONOCYTES # BLD AUTO: 0.42 K/UL (ref 0–0.85)
MONOCYTES NFR BLD AUTO: 12.3 % (ref 0–13.4)
MORPHOLOGY BLD-IMP: NORMAL
MYELOCYTES NFR BLD MANUAL: 0.9 %
NEUTROPHILS # BLD AUTO: 2.44 K/UL (ref 2–7.15)
NEUTROPHILS NFR BLD: 70.2 % (ref 44–72)
NEUTS BAND NFR BLD MANUAL: 1.7 % (ref 0–10)
NRBC # BLD AUTO: 0 K/UL
NRBC BLD-RTO: 0 /100 WBC
PLATELET # BLD AUTO: 188 K/UL (ref 164–446)
PLATELET BLD QL SMEAR: NORMAL
PMV BLD AUTO: 10 FL (ref 9–12.9)
POTASSIUM SERPL-SCNC: 4 MMOL/L (ref 3.6–5.5)
RBC # BLD AUTO: 4.79 M/UL (ref 4.2–5.4)
RBC BLD AUTO: PRESENT
SODIUM SERPL-SCNC: 137 MMOL/L (ref 135–145)
TROPONIN T SERPL-MCNC: 22 NG/L (ref 6–19)
WBC # BLD AUTO: 3.4 K/UL (ref 4.8–10.8)

## 2021-09-17 PROCEDURE — 80048 BASIC METABOLIC PNL TOTAL CA: CPT

## 2021-09-17 PROCEDURE — 85027 COMPLETE CBC AUTOMATED: CPT

## 2021-09-17 PROCEDURE — 85007 BL SMEAR W/DIFF WBC COUNT: CPT

## 2021-09-17 PROCEDURE — 700117 HCHG RX CONTRAST REV CODE 255: Performed by: EMERGENCY MEDICINE

## 2021-09-17 PROCEDURE — 99283 EMERGENCY DEPT VISIT LOW MDM: CPT

## 2021-09-17 PROCEDURE — 84484 ASSAY OF TROPONIN QUANT: CPT

## 2021-09-17 PROCEDURE — 71275 CT ANGIOGRAPHY CHEST: CPT

## 2021-09-17 RX ADMIN — IOHEXOL 50 ML: 350 INJECTION, SOLUTION INTRAVENOUS at 17:30

## 2021-09-17 ASSESSMENT — LIFESTYLE VARIABLES
HAVE PEOPLE ANNOYED YOU BY CRITICIZING YOUR DRINKING: NO
TOTAL SCORE: 0
HAVE YOU EVER FELT YOU SHOULD CUT DOWN ON YOUR DRINKING: NO
AVERAGE NUMBER OF DAYS PER WEEK YOU HAVE A DRINK CONTAINING ALCOHOL: 0
TOTAL SCORE: 0
CONSUMPTION TOTAL: NEGATIVE
EVER HAD A DRINK FIRST THING IN THE MORNING TO STEADY YOUR NERVES TO GET RID OF A HANGOVER: NO
HOW MANY TIMES IN THE PAST YEAR HAVE YOU HAD 5 OR MORE DRINKS IN A DAY: 0
EVER FELT BAD OR GUILTY ABOUT YOUR DRINKING: NO
ON A TYPICAL DAY WHEN YOU DRINK ALCOHOL HOW MANY DRINKS DO YOU HAVE: 0
TOTAL SCORE: 0
DO YOU DRINK ALCOHOL: NO

## 2021-09-17 ASSESSMENT — FIBROSIS 4 INDEX: FIB4 SCORE: 3.1

## 2021-09-17 NOTE — ED NOTES
Pt ambulated to purple pod, NAD, no respiratory distress, GCS 15, agree with triage RN note, up for EP to see.

## 2021-09-17 NOTE — ED TRIAGE NOTES
Chief Complaint   Patient presents with   • Cough     x1 week, pt reports coughing blood this morning   • Shortness of Breath     x1 week, diagnosed with covid 2 days ago, worsening symptoms today     Pt ambulatory to triage for above complaints. Pt in NAD. VSS.

## 2021-09-18 NOTE — ED PROVIDER NOTES
ED Provider Note    CHIEF COMPLAINT  Chief Complaint   Patient presents with   • Cough     x1 week, pt reports coughing blood this morning   • Shortness of Breath     x1 week, diagnosed with covid 2 days ago, worsening symptoms today       HPI  Cheryl Sultana is a 46 y.o. female who presents to the Emergency Department with no Covid infection.  She was seen here on the 13th and had a positive Covid test.  The patient states that she has had improving symptoms with less of a fever, she is continuing to cough however and she states she coughed so hard today that there was blood in her cough.  She is also continuing to feel short of breath.  She has poor appetite.  She denies nausea vomiting or diarrhea.  Patient is already using Tessalon Perles.   REVIEW OF SYSTEMS  As above, all other systems negative.  PAST MEDICAL HISTORY   has a past medical history of Thyroid disease.    SOCIAL HISTORY  Social History     Tobacco Use   • Smoking status: Never Smoker   • Smokeless tobacco: Never Used   Substance and Sexual Activity   • Alcohol use: No   • Drug use: Not on file   • Sexual activity: Not on file       SURGICAL HISTORY   has a past surgical history that includes tubal coagulation laparoscopic bilateral.    CURRENT MEDICATIONS  Reviewed.  See Encounter Summary.  Include   Home Medications    Medication Sig Taking? Last Dose Authorizing Provider   benzonatate (TESSALON) 100 MG Cap Take 1 Capsule by mouth 3 times a day as needed for Cough.   Jose Carlos Tellez M.D.   amoxicillin-clavulanate (AUGMENTIN) 875-125 MG Tab Take 1 Tab by mouth 2 times a day.   DOROTHEA MartinesARaj-CARY   levothyroxine (LEVOXYL) 50 MCG Tab Take 1 Tab by mouth Every morning on an empty stomach.   John Villegas M.D.   carvedilol (COREG) 3.125 MG TABS Take 1 Tab by mouth 2 times a day, with meals.   John Villegas M.D.   Homeopathic Products SOLN by Ophthalmic route 3 times a day.   Physician Outpatient   CALCIUM CARBONATE PO Take  by  "mouth.   Physician Outpatient       ALLERGIES  No Known Allergies    PHYSICAL EXAM  VITAL SIGNS: /66   Pulse (!) 106   Temp 37.4 °C (99.3 °F) (Temporal)   Resp 18   Ht 1.473 m (4' 10\")   Wt 45.7 kg (100 lb 12 oz)   SpO2 91% Comment: While ambulating  BMI 21.06 kg/m²   Constitutional: Speaking in full sentences, Alert in no apparent distress.  Not hypoxic while ambulating  HENT: Normocephalic, Bilateral external ears normal. Nose normal.   Eyes: Pupils are equal and reactive. Conjunctiva normal, non-icteric.   Thorax & Lungs: Easy unlabored respirations  Abdomen:  No gross signs of peritonitis, no pain with movement   Skin: Visualized skin is  Dry, No erythema, No rash.   Extremities:   No edema, No asymmetry  Neurologic: Alert, Grossly non-focal.   Psychiatric: Affect and Mood normal      COURSE & MEDICAL DECISION MAKING  Nursing notes and vital signs were reviewed. (See chart for details)    The patient presents to the Emergency Department with known Covid infection now with continued dyspnea, no hypoxemia but hemoptysis.  Primary concern at this time would be for pulmonary emboli, I will also check because of her dyspnea troponin to make sure there is no evidence of myocarditis.    CT-CTA CHEST PULMONARY ARTERY W/ RECONS   Final Result      1.  No CT evidence for pulmonary emboli.   2.  Extensive bilateral groundglass opacities, most confluent in the lower lobes bilaterally, consistent with Covid 19 pneumonia.              Results for orders placed or performed during the hospital encounter of 09/17/21   CBC WITH DIFFERENTIAL   Result Value Ref Range    WBC 3.4 (L) 4.8 - 10.8 K/uL    RBC 4.79 4.20 - 5.40 M/uL    Hemoglobin 14.8 12.0 - 16.0 g/dL    Hematocrit 44.2 37.0 - 47.0 %    MCV 92.3 81.4 - 97.8 fL    MCH 30.9 27.0 - 33.0 pg    MCHC 33.5 (L) 33.6 - 35.0 g/dL    RDW 43.8 35.9 - 50.0 fL    Platelet Count 188 164 - 446 K/uL    MPV 10.0 9.0 - 12.9 fL    Neutrophils-Polys 70.20 44.00 - 72.00 %    " Lymphocytes 14.00 (L) 22.00 - 41.00 %    Monocytes 12.30 0.00 - 13.40 %    Eosinophils 0.00 0.00 - 6.90 %    Basophils 0.00 0.00 - 1.80 %    Nucleated RBC 0.00 /100 WBC    Neutrophils (Absolute) 2.44 2.00 - 7.15 K/uL    Lymphs (Absolute) 0.48 (L) 1.00 - 4.80 K/uL    Monos (Absolute) 0.42 0.00 - 0.85 K/uL    Eos (Absolute) 0.00 0.00 - 0.51 K/uL    Baso (Absolute) 0.00 0.00 - 0.12 K/uL    NRBC (Absolute) 0.00 K/uL    Anisocytosis 1+     Microcytosis 1+    Basic Metabolic Panel   Result Value Ref Range    Sodium 137 135 - 145 mmol/L    Potassium 4.0 3.6 - 5.5 mmol/L    Chloride 104 96 - 112 mmol/L    Co2 24 20 - 33 mmol/L    Glucose 92 65 - 99 mg/dL    Bun 12 8 - 22 mg/dL    Creatinine 0.90 0.50 - 1.40 mg/dL    Calcium 7.7 (L) 8.5 - 10.5 mg/dL    Anion Gap 9.0 7.0 - 16.0   TROPONIN   Result Value Ref Range    Troponin T 22 (H) 6 - 19 ng/L   DIFFERENTIAL MANUAL   Result Value Ref Range    Bands-Stabs 1.70 0.00 - 10.00 %    Metamyelocytes 0.90 %    Myelocytes 0.90 %    Manual Diff Status PERFORMED    PERIPHERAL SMEAR REVIEW   Result Value Ref Range    Peripheral Smear Review see below    PLATELET ESTIMATE   Result Value Ref Range    Plt Estimation Normal    MORPHOLOGY   Result Value Ref Range    RBC Morphology Present    ESTIMATED GFR   Result Value Ref Range    GFR If African American >60 >60 mL/min/1.73 m 2    GFR If Non African American >60 >60 mL/min/1.73 m 2     Patient has no CT evidence of pulmonary emboli she does have Covid pneumonia, her labs are consistent with a lymphopenia consistent with a Covid infection.  There is no evidence of heart enlargement or troponin elevation to suggest an acute myocarditis.    I discussed with the patient that she needs to sleep only on her side she is not hypoxic does not meet criteria for steroids at this time.  She may worsen she needs to watch her symptoms carefully if she feels like she is becoming more short of breath she needs to have her oxygen checked again.  She is  already been advised to get a home pulse oximeter.  She knows we will check her again at any time if she worsens.  She does not meet criteria at this time for admission to the hospital or home oxygen        The patient was discharged home with an information sheet on  COVID-19 positive test (U07.1, COVID-19) with Acute Pneumonia (J12.89, Other viral pneumonia)  (If respiratory failure or sepsis present, add as separate assessment)    And told to return immediately for any signs or symptoms listed, or any unexpected symptoms.  The patient verbally agreed to the discharge precautions and follow-up plan which is documented in EPIC.  DISPOSITION:    Patient will be discharged home in stable condition.    FOLLOW UP:  [facility], Emergency Dept  [address] [id number] [phone]    for recheck if any worsening        FINAL IMPRESSION   1. Pneumonia due to COVID-19 virus

## 2021-09-21 ENCOUNTER — OFFICE VISIT (OUTPATIENT)
Dept: URGENT CARE | Facility: CLINIC | Age: 46
End: 2021-09-21
Payer: COMMERCIAL

## 2021-09-21 VITALS
TEMPERATURE: 97.1 F | RESPIRATION RATE: 16 BRPM | WEIGHT: 101 LBS | DIASTOLIC BLOOD PRESSURE: 70 MMHG | SYSTOLIC BLOOD PRESSURE: 112 MMHG | OXYGEN SATURATION: 94 % | HEART RATE: 105 BPM | BODY MASS INDEX: 21.2 KG/M2 | HEIGHT: 58 IN

## 2021-09-21 DIAGNOSIS — U07.1 PNEUMONIA DUE TO COVID-19 VIRUS: ICD-10-CM

## 2021-09-21 DIAGNOSIS — R06.02 SOB (SHORTNESS OF BREATH): ICD-10-CM

## 2021-09-21 DIAGNOSIS — R04.2 HEMOPTYSIS: ICD-10-CM

## 2021-09-21 DIAGNOSIS — J12.82 PNEUMONIA DUE TO COVID-19 VIRUS: ICD-10-CM

## 2021-09-21 DIAGNOSIS — U07.1 COVID-19 VIRUS INFECTION: ICD-10-CM

## 2021-09-21 PROCEDURE — 93005 ELECTROCARDIOGRAM TRACING: CPT | Performed by: EMERGENCY MEDICINE

## 2021-09-21 PROCEDURE — 93005 ELECTROCARDIOGRAM TRACING: CPT

## 2021-09-21 PROCEDURE — 99285 EMERGENCY DEPT VISIT HI MDM: CPT

## 2021-09-21 PROCEDURE — 99215 OFFICE O/P EST HI 40 MIN: CPT | Performed by: NURSE PRACTITIONER

## 2021-09-21 ASSESSMENT — ENCOUNTER SYMPTOMS
MYALGIAS: 0
BRUISES/BLEEDS EASILY: 0
ABDOMINAL PAIN: 0
HEMOPTYSIS: 1
VOMITING: 0
NAUSEA: 0
SPUTUM PRODUCTION: 1
SORE THROAT: 0
EYE REDNESS: 0
COUGH: 1
CHILLS: 0
SHORTNESS OF BREATH: 1
FEVER: 0

## 2021-09-21 ASSESSMENT — FIBROSIS 4 INDEX: FIB4 SCORE: 2.34

## 2021-09-22 ENCOUNTER — APPOINTMENT (OUTPATIENT)
Dept: RADIOLOGY | Facility: MEDICAL CENTER | Age: 46
End: 2021-09-22
Attending: EMERGENCY MEDICINE
Payer: COMMERCIAL

## 2021-09-22 ENCOUNTER — APPOINTMENT (OUTPATIENT)
Dept: CARDIOLOGY | Facility: MEDICAL CENTER | Age: 46
End: 2021-09-22
Attending: HOSPITALIST
Payer: COMMERCIAL

## 2021-09-22 ENCOUNTER — HOSPITAL ENCOUNTER (OUTPATIENT)
Facility: MEDICAL CENTER | Age: 46
End: 2021-09-23
Attending: EMERGENCY MEDICINE | Admitting: HOSPITALIST
Payer: COMMERCIAL

## 2021-09-22 DIAGNOSIS — R07.9 CHEST PAIN, UNSPECIFIED TYPE: ICD-10-CM

## 2021-09-22 DIAGNOSIS — R79.89 ELEVATED TROPONIN: ICD-10-CM

## 2021-09-22 DIAGNOSIS — U07.1 COVID-19 VIRUS INFECTION: ICD-10-CM

## 2021-09-22 PROBLEM — J12.82 PNEUMONIA DUE TO COVID-19 VIRUS: Status: ACTIVE | Noted: 2021-09-22

## 2021-09-22 LAB
ALBUMIN SERPL BCP-MCNC: 2.6 G/DL (ref 3.2–4.9)
ALBUMIN/GLOB SERPL: 0.8 G/DL
ALP SERPL-CCNC: 110 U/L (ref 30–99)
ALT SERPL-CCNC: 29 U/L (ref 2–50)
ANION GAP SERPL CALC-SCNC: 10 MMOL/L (ref 7–16)
AST SERPL-CCNC: 30 U/L (ref 12–45)
BASOPHILS # BLD AUTO: 0 % (ref 0–1.8)
BASOPHILS # BLD: 0 K/UL (ref 0–0.12)
BILIRUB SERPL-MCNC: 0.6 MG/DL (ref 0.1–1.5)
BUN SERPL-MCNC: 8 MG/DL (ref 8–22)
CALCIUM SERPL-MCNC: 8 MG/DL (ref 8.5–10.5)
CHLORIDE SERPL-SCNC: 106 MMOL/L (ref 96–112)
CO2 SERPL-SCNC: 24 MMOL/L (ref 20–33)
CREAT SERPL-MCNC: 0.54 MG/DL (ref 0.5–1.4)
CRP SERPL HS-MCNC: 11.15 MG/DL (ref 0–0.75)
D DIMER PPP IA.FEU-MCNC: 2.53 UG/ML (FEU) (ref 0–0.5)
EKG IMPRESSION: NORMAL
EOSINOPHIL # BLD AUTO: 0.09 K/UL (ref 0–0.51)
EOSINOPHIL NFR BLD: 1.1 % (ref 0–6.9)
ERYTHROCYTE [DISTWIDTH] IN BLOOD BY AUTOMATED COUNT: 41.1 FL (ref 35.9–50)
GLOBULIN SER CALC-MCNC: 3.2 G/DL (ref 1.9–3.5)
GLUCOSE SERPL-MCNC: 85 MG/DL (ref 65–99)
HCT VFR BLD AUTO: 41.5 % (ref 37–47)
HGB BLD-MCNC: 13.6 G/DL (ref 12–16)
LACTATE BLD-SCNC: 1.3 MMOL/L (ref 0.5–2)
LV EJECT FRACT  99904: 65
LV EJECT FRACT MOD 2C 99903: 58.17
LV EJECT FRACT MOD 4C 99902: 66.21
LYMPHOCYTES # BLD AUTO: 0.82 K/UL (ref 1–4.8)
LYMPHOCYTES NFR BLD: 10.1 % (ref 22–41)
MANUAL DIFF BLD: NORMAL
MCH RBC QN AUTO: 30 PG (ref 27–33)
MCHC RBC AUTO-ENTMCNC: 32.8 G/DL (ref 33.6–35)
MCV RBC AUTO: 91.6 FL (ref 81.4–97.8)
MONOCYTES # BLD AUTO: 0.73 K/UL (ref 0–0.85)
MONOCYTES NFR BLD AUTO: 9 % (ref 0–13.4)
MORPHOLOGY BLD-IMP: NORMAL
NEUTROPHILS # BLD AUTO: 6.46 K/UL (ref 2–7.15)
NEUTROPHILS NFR BLD: 76.4 % (ref 44–72)
NEUTS BAND NFR BLD MANUAL: 3.4 % (ref 0–10)
NRBC # BLD AUTO: 0 K/UL
NRBC BLD-RTO: 0 /100 WBC
PLATELET # BLD AUTO: 388 K/UL (ref 164–446)
PLATELET BLD QL SMEAR: NORMAL
PMV BLD AUTO: 9.2 FL (ref 9–12.9)
POTASSIUM SERPL-SCNC: 3.7 MMOL/L (ref 3.6–5.5)
PROT SERPL-MCNC: 5.8 G/DL (ref 6–8.2)
RBC # BLD AUTO: 4.53 M/UL (ref 4.2–5.4)
RBC BLD AUTO: NORMAL
SODIUM SERPL-SCNC: 140 MMOL/L (ref 135–145)
TROPONIN T SERPL-MCNC: 44 NG/L (ref 6–19)
TROPONIN T SERPL-MCNC: 48 NG/L (ref 6–19)
TROPONIN T SERPL-MCNC: 57 NG/L (ref 6–19)
WBC # BLD AUTO: 8.1 K/UL (ref 4.8–10.8)

## 2021-09-22 PROCEDURE — 700102 HCHG RX REV CODE 250 W/ 637 OVERRIDE(OP): Performed by: EMERGENCY MEDICINE

## 2021-09-22 PROCEDURE — 700105 HCHG RX REV CODE 258: Performed by: EMERGENCY MEDICINE

## 2021-09-22 PROCEDURE — 99285 EMERGENCY DEPT VISIT HI MDM: CPT

## 2021-09-22 PROCEDURE — A9270 NON-COVERED ITEM OR SERVICE: HCPCS | Performed by: EMERGENCY MEDICINE

## 2021-09-22 PROCEDURE — 84484 ASSAY OF TROPONIN QUANT: CPT

## 2021-09-22 PROCEDURE — 93306 TTE W/DOPPLER COMPLETE: CPT

## 2021-09-22 PROCEDURE — 86140 C-REACTIVE PROTEIN: CPT

## 2021-09-22 PROCEDURE — 80053 COMPREHEN METABOLIC PANEL: CPT

## 2021-09-22 PROCEDURE — G0378 HOSPITAL OBSERVATION PER HR: HCPCS

## 2021-09-22 PROCEDURE — 85379 FIBRIN DEGRADATION QUANT: CPT

## 2021-09-22 PROCEDURE — 700102 HCHG RX REV CODE 250 W/ 637 OVERRIDE(OP): Performed by: HOSPITALIST

## 2021-09-22 PROCEDURE — 99220 PR INITIAL OBSERVATION CARE,LEVL III: CPT | Mod: CS | Performed by: HOSPITALIST

## 2021-09-22 PROCEDURE — 93306 TTE W/DOPPLER COMPLETE: CPT | Mod: 26 | Performed by: INTERNAL MEDICINE

## 2021-09-22 PROCEDURE — 83605 ASSAY OF LACTIC ACID: CPT

## 2021-09-22 PROCEDURE — 71045 X-RAY EXAM CHEST 1 VIEW: CPT

## 2021-09-22 PROCEDURE — 85027 COMPLETE CBC AUTOMATED: CPT

## 2021-09-22 PROCEDURE — 85007 BL SMEAR W/DIFF WBC COUNT: CPT

## 2021-09-22 PROCEDURE — A9270 NON-COVERED ITEM OR SERVICE: HCPCS | Performed by: HOSPITALIST

## 2021-09-22 RX ORDER — CHLORTHALIDONE 25 MG/1
25 TABLET ORAL DAILY
Status: SHIPPED | COMMUNITY
End: 2022-11-01

## 2021-09-22 RX ORDER — MORPHINE SULFATE 4 MG/ML
2-4 INJECTION, SOLUTION INTRAMUSCULAR; INTRAVENOUS
Status: DISCONTINUED | OUTPATIENT
Start: 2021-09-22 | End: 2021-09-23 | Stop reason: HOSPADM

## 2021-09-22 RX ORDER — ASPIRIN 81 MG/1
324 TABLET, CHEWABLE ORAL DAILY
Status: DISCONTINUED | OUTPATIENT
Start: 2021-09-22 | End: 2021-09-23 | Stop reason: HOSPADM

## 2021-09-22 RX ORDER — ONDANSETRON 4 MG/1
4 TABLET, ORALLY DISINTEGRATING ORAL EVERY 4 HOURS PRN
Status: DISCONTINUED | OUTPATIENT
Start: 2021-09-22 | End: 2021-09-23 | Stop reason: HOSPADM

## 2021-09-22 RX ORDER — PROCHLORPERAZINE EDISYLATE 5 MG/ML
5-10 INJECTION INTRAMUSCULAR; INTRAVENOUS EVERY 4 HOURS PRN
Status: DISCONTINUED | OUTPATIENT
Start: 2021-09-22 | End: 2021-09-23 | Stop reason: HOSPADM

## 2021-09-22 RX ORDER — PROMETHAZINE HYDROCHLORIDE 25 MG/1
12.5-25 TABLET ORAL EVERY 4 HOURS PRN
Status: DISCONTINUED | OUTPATIENT
Start: 2021-09-22 | End: 2021-09-23 | Stop reason: HOSPADM

## 2021-09-22 RX ORDER — LEVOTHYROXINE SODIUM 0.05 MG/1
75-100 TABLET ORAL
COMMUNITY

## 2021-09-22 RX ORDER — PROMETHAZINE HYDROCHLORIDE 25 MG/1
12.5-25 SUPPOSITORY RECTAL EVERY 4 HOURS PRN
Status: DISCONTINUED | OUTPATIENT
Start: 2021-09-22 | End: 2021-09-23 | Stop reason: HOSPADM

## 2021-09-22 RX ORDER — ONDANSETRON 2 MG/ML
4 INJECTION INTRAMUSCULAR; INTRAVENOUS EVERY 4 HOURS PRN
Status: DISCONTINUED | OUTPATIENT
Start: 2021-09-22 | End: 2021-09-23 | Stop reason: HOSPADM

## 2021-09-22 RX ORDER — LEVOTHYROXINE SODIUM 0.07 MG/1
37.5 TABLET ORAL
Status: DISCONTINUED | OUTPATIENT
Start: 2021-09-24 | End: 2021-09-23 | Stop reason: HOSPADM

## 2021-09-22 RX ORDER — LEVOTHYROXINE SODIUM 0.05 MG/1
50 TABLET ORAL
Status: DISCONTINUED | OUTPATIENT
Start: 2021-09-23 | End: 2021-09-23 | Stop reason: HOSPADM

## 2021-09-22 RX ORDER — LIOTHYRONINE SODIUM 5 UG/1
10 TABLET ORAL DAILY
COMMUNITY

## 2021-09-22 RX ORDER — ASPIRIN 300 MG/1
300 SUPPOSITORY RECTAL DAILY
Status: DISCONTINUED | OUTPATIENT
Start: 2021-09-22 | End: 2021-09-23 | Stop reason: HOSPADM

## 2021-09-22 RX ORDER — POLYETHYLENE GLYCOL 3350 17 G/17G
1 POWDER, FOR SOLUTION ORAL
Status: DISCONTINUED | OUTPATIENT
Start: 2021-09-22 | End: 2021-09-23 | Stop reason: HOSPADM

## 2021-09-22 RX ORDER — NITROGLYCERIN 0.4 MG/1
0.4 TABLET SUBLINGUAL
Status: DISCONTINUED | OUTPATIENT
Start: 2021-09-22 | End: 2021-09-23 | Stop reason: HOSPADM

## 2021-09-22 RX ORDER — ACETAMINOPHEN 500 MG
1000 TABLET ORAL ONCE
Status: COMPLETED | OUTPATIENT
Start: 2021-09-22 | End: 2021-09-22

## 2021-09-22 RX ORDER — BISACODYL 10 MG
10 SUPPOSITORY, RECTAL RECTAL
Status: DISCONTINUED | OUTPATIENT
Start: 2021-09-22 | End: 2021-09-23 | Stop reason: HOSPADM

## 2021-09-22 RX ORDER — SODIUM CHLORIDE 9 MG/ML
500 INJECTION, SOLUTION INTRAVENOUS ONCE
Status: COMPLETED | OUTPATIENT
Start: 2021-09-22 | End: 2021-09-22

## 2021-09-22 RX ORDER — DEXAMETHASONE 6 MG/1
6 TABLET ORAL DAILY
Status: DISCONTINUED | OUTPATIENT
Start: 2021-09-22 | End: 2021-09-23 | Stop reason: HOSPADM

## 2021-09-22 RX ORDER — IBUPROFEN 200 MG
400 TABLET ORAL EVERY 6 HOURS PRN
Status: SHIPPED | COMMUNITY
End: 2022-11-01

## 2021-09-22 RX ORDER — ASPIRIN 325 MG
325 TABLET ORAL DAILY
Status: DISCONTINUED | OUTPATIENT
Start: 2021-09-22 | End: 2021-09-23 | Stop reason: HOSPADM

## 2021-09-22 RX ORDER — AMOXICILLIN 250 MG
2 CAPSULE ORAL 2 TIMES DAILY
Status: DISCONTINUED | OUTPATIENT
Start: 2021-09-22 | End: 2021-09-23 | Stop reason: HOSPADM

## 2021-09-22 RX ORDER — CARVEDILOL 3.12 MG/1
3.12 TABLET ORAL 2 TIMES DAILY WITH MEALS
Status: DISCONTINUED | OUTPATIENT
Start: 2021-09-22 | End: 2021-09-23 | Stop reason: HOSPADM

## 2021-09-22 RX ADMIN — CARVEDILOL 3.12 MG: 3.12 TABLET, FILM COATED ORAL at 20:19

## 2021-09-22 RX ADMIN — DEXAMETHASONE 6 MG: 6 TABLET ORAL at 16:16

## 2021-09-22 RX ADMIN — ACETAMINOPHEN 1000 MG: 500 TABLET ORAL at 05:48

## 2021-09-22 RX ADMIN — SODIUM CHLORIDE 500 ML: 9 INJECTION, SOLUTION INTRAVENOUS at 05:48

## 2021-09-22 RX ADMIN — ASPIRIN 325 MG ORAL TABLET 325 MG: 325 PILL ORAL at 08:15

## 2021-09-22 ASSESSMENT — ENCOUNTER SYMPTOMS
SHORTNESS OF BREATH: 1
NECK PAIN: 0
SEIZURES: 0
BLURRED VISION: 0
SPEECH CHANGE: 0
DIARRHEA: 0
COUGH: 1
CHILLS: 0
PALPITATIONS: 0
NERVOUS/ANXIOUS: 0
FOCAL WEAKNESS: 0
SENSORY CHANGE: 0
VOMITING: 0
ABDOMINAL PAIN: 0
FEVER: 0
BACK PAIN: 0
SORE THROAT: 0
NAUSEA: 0
DIZZINESS: 0
EYE REDNESS: 0
EYE DISCHARGE: 0
HEADACHES: 0
STRIDOR: 0

## 2021-09-22 ASSESSMENT — FIBROSIS 4 INDEX
FIB4 SCORE: 0.69
FIB4 SCORE: 2.34

## 2021-09-22 ASSESSMENT — PAIN DESCRIPTION - PAIN TYPE
TYPE: ACUTE PAIN
TYPE: ACUTE PAIN

## 2021-09-22 NOTE — PROGRESS NOTES
Pt educated on BP medication importance. Pt educated BP control. Pt declining medication at this time

## 2021-09-22 NOTE — ASSESSMENT & PLAN NOTE
Myositis vs hypoxia induced  ]echocardiogram ordered  Follow serial troponin  Monitor on telemetry  Check ddimer, ESR, ddimer  Had CTA chest 9/17 with negative for PE

## 2021-09-22 NOTE — PROGRESS NOTES
Cheryl Sultana is a 46 y.o. female who presents for Pneumonia (diagnosed with pneumonia x 4 days , patient not feeling better )      HPI this new problem.  Kristi Ambrose is a 46-year-old female presents with pneumonia and Covid infection.  She was diagnosed 4 days ago.  She is not feeling better.  She reports she has short of breath.  She has increased sputum production.  She is coughing up blood.  She has chest tightness across anterior chest wall ( this is persistent sx and not new or worsening).  She is requesting antibiotic and steroids to get better.  She said that some of her friends have Covid pneumonia got steroids and antibiotics that she was not prescribed antibiotics from the ER.  She feels worse today.  Treatment tried: rest and increased fluids. No other aggravating or alleviating factors.    Review of Systems   Constitutional: Positive for malaise/fatigue. Negative for chills and fever.   HENT: Positive for congestion. Negative for sore throat.    Eyes: Negative for redness.   Respiratory: Positive for cough, hemoptysis, sputum production and shortness of breath.    Cardiovascular: Positive for chest pain.   Gastrointestinal: Negative for abdominal pain, nausea and vomiting.   Musculoskeletal: Negative for myalgias.   Endo/Heme/Allergies: Does not bruise/bleed easily.       No Known Allergies  Past Medical History:   Diagnosis Date   • Thyroid disease      Past Surgical History:   Procedure Laterality Date   • TUBAL COAGULATION LAPAROSCOPIC BILATERAL       Family History   Problem Relation Age of Onset   • Thyroid Paternal Aunt         hyperthyroidism   • Thyroid Paternal Aunt         hyperthyroidism   • Thyroid Maternal Grandmother         goiter     Social History     Tobacco Use   • Smoking status: Never Smoker   • Smokeless tobacco: Never Used   Substance Use Topics   • Alcohol use: No     Patient Active Problem List   Diagnosis   • Hyperthyroidism   • Hypothyroidism following radioiodine  "therapy     Current Outpatient Medications on File Prior to Visit   Medication Sig Dispense Refill   • levothyroxine (LEVOXYL) 50 MCG Tab Take 1 Tab by mouth Every morning on an empty stomach. 30 Tab 3   • carvedilol (COREG) 3.125 MG TABS Take 1 Tab by mouth 2 times a day, with meals. 60 Tab 3   • CALCIUM CARBONATE PO Take  by mouth.       No current facility-administered medications on file prior to visit.          Objective:     /70 (BP Location: Left arm, Patient Position: Sitting, BP Cuff Size: Adult)   Pulse (!) 105   Temp 36.2 °C (97.1 °F) (Temporal)   Resp 16   Ht 1.473 m (4' 10\")   Wt 45.8 kg (101 lb)   SpO2 94%   BMI 21.11 kg/m²     Physical Exam  Vitals and nursing note reviewed.   Constitutional:       Appearance: Normal appearance. She is normal weight. She is ill-appearing.   Cardiovascular:      Rate and Rhythm: Tachycardia present.   Pulmonary:      Effort: Pulmonary effort is normal. No respiratory distress.   Skin:     Capillary Refill: Capillary refill takes less than 2 seconds.   Neurological:      Mental Status: She is alert and oriented to person, place, and time.   Psychiatric:         Thought Content: Thought content normal.      Comments: Anxious           Assessment /Associated Orders:      1. Pneumonia due to COVID-19 virus     2. COVID-19 virus infection     3. Hemoptysis     4. SOB (shortness of breath)         Medical Decision Making:    Pt is clinically stable at today's acute urgent care visit.  No acute distress noted.   Patient diagnosed with Covid pneumonia from the ER 4 days ago.  She reports that she is getting worse.  Now coughing up blood with shortness of breath.  She reports that her symptoms are getting much worse.  I have explained to her that this is a viral pneumonia.  Patient is requesting antibiotics to treat \"pneumonia\".  She keeps saying that she feels worse and needs medications.  I feel that this patient would best be suited being treated in the " emergency room tonight.  Her vital signs are stable.  She is mildly tachycardic.  She is mildly ill-appearing.    [facility] transfer center  called to arrange transfer to higher level of care.  Pt is to be transported via POV    I have reiterated to patient that although an Urgent Care to ER transfer was made this will not necessarily expedite the ER process              Please note that this dictation was created using voice recognition software. I have made a reasonable attempt to correct obvious errors, but I expect that there are errors of grammar and possibly content that I did not discover before finalizing the note.    This note was electronically signed by Pauline WHITE, Urgent Care

## 2021-09-22 NOTE — H&P
Hospital Medicine History & Physical Note    Date of Service  9/22/2021    Primary Care Physician  LYNDA Nesbitt    Consultants  None      Code Status  Full Code    Chief Complaint  Chief Complaint   Patient presents with   • Chest Pain     started friday, worsening today. L side chest pain radiates to throat per pt. Was diagnosed w/ covid pnemonia last friday per pt.       History of Presenting Illness  Cheryl Sultana is a 46 y.o. female with hypothyroid who presented 9/22/2021 with chest pain and current known COVID 19 pneumonia for ~7 days.  She as been in the hospital lately and had a CTA chest 4 days ago w/o PE.  She presents today with chest pain upon coughing.  Her troponin is elevated and EKG shows mild flattening of the ST segments. She is denies any family/personel history of blood clots to legs/lungs.      I discussed the plan of care with patient and family.    Review of Systems  Review of Systems   Constitutional: Negative for chills and fever.   HENT: Negative for sore throat.    Eyes: Negative for discharge.   Respiratory: Positive for cough and shortness of breath. Negative for stridor.    Cardiovascular: Positive for chest pain. Negative for palpitations and leg swelling.   Gastrointestinal: Negative for abdominal pain, diarrhea, nausea and vomiting.   Genitourinary: Negative for hematuria.   Musculoskeletal: Negative for back pain and joint pain.   Neurological: Negative for dizziness, sensory change, speech change and headaches.   Psychiatric/Behavioral: The patient is not nervous/anxious.        Past Medical History   has a past medical history of Thyroid disease.    Surgical History   has a past surgical history that includes tubal coagulation laparoscopic bilateral.     Family History  family history includes Thyroid in her maternal grandmother, paternal aunt, and paternal aunt.   Family history reviewed with patient. There is no family history that is pertinent to  the chief complaint.     Social History   reports that she has never smoked. She has never used smokeless tobacco. She reports that she does not drink alcohol. . 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 Has three children.    Allergies  Allergies   Allergen Reactions   • Methimazole Rash       Medications  Prior to Admission Medications   Prescriptions Last Dose Informant Patient Reported? Taking?   CALCIUM CARBONATE PO   Yes No   Sig: Take  by mouth.   carvedilol (COREG) 3.125 MG TABS   No No   Sig: Take 1 Tab by mouth 2 times a day, with meals.   levothyroxine (LEVOXYL) 50 MCG Tab   No No   Sig: Take 1 Tab by mouth Every morning on an empty stomach.      Facility-Administered Medications: None       Physical Exam  Temp:  [36.7 °C (98 °F)-37.1 °C (98.8 °F)] 36.7 °C (98 °F)  Pulse:  [] 93  Resp:  [16-18] 18  BP: (154-179)/() 156/83  SpO2:  [92 %-95 %] 94 %  Blood Pressure: 156/83   Temperature: 36.7 °C (98 °F)   Pulse: 93   Respiration: 18   Pulse Oximetry: 94 %       Physical Exam  Vitals reviewed.   Constitutional:       Appearance: Normal appearance. She is not diaphoretic.   HENT:      Head: Normocephalic and atraumatic.      Nose: Nose normal.      Mouth/Throat:      Mouth: Mucous membranes are moist.      Pharynx: No oropharyngeal exudate.   Eyes:      General: No scleral icterus.        Right eye: No discharge.         Left eye: No discharge.      Extraocular Movements: Extraocular movements intact.      Conjunctiva/sclera: Conjunctivae normal.   Cardiovascular:      Rate and Rhythm: Normal rate and regular rhythm.      Pulses:           Radial pulses are 2+ on the right side and 2+ on the left side.        Dorsalis pedis pulses are 2+ on the right side and 2+ on the left side.      Heart sounds: No murmur heard.     Pulmonary:      Effort: Pulmonary effort is normal. No respiratory distress.      Breath sounds: Rhonchi present. No wheezing or rales.   Abdominal:      General:  Bowel sounds are normal. There is no distension.      Palpations: Abdomen is soft.   Musculoskeletal:         General: No swelling or tenderness.      Cervical back: No muscular tenderness.      Right lower leg: No edema.      Left lower leg: No edema.   Lymphadenopathy:      Cervical: No cervical adenopathy.   Skin:     Coloration: Skin is not jaundiced or pale.   Neurological:      General: No focal deficit present.      Mental Status: She is alert and oriented to person, place, and time. Mental status is at baseline.      Cranial Nerves: No cranial nerve deficit.   Psychiatric:         Mood and Affect: Mood normal.         Behavior: Behavior normal.         Laboratory:  Recent Labs     09/22/21  0417   WBC 8.1   RBC 4.53   HEMOGLOBIN 13.6   HEMATOCRIT 41.5   MCV 91.6   MCH 30.0   MCHC 32.8*   RDW 41.1   PLATELETCT 388   MPV 9.2     Recent Labs     09/22/21  0534   SODIUM 140   POTASSIUM 3.7   CHLORIDE 106   CO2 24   GLUCOSE 85   BUN 8   CREATININE 0.54   CALCIUM 8.0*     Recent Labs     09/22/21  0534   ALTSGPT 29   ASTSGOT 30   ALKPHOSPHAT 110*   TBILIRUBIN 0.6   GLUCOSE 85         No results for input(s): NTPROBNP in the last 72 hours.      Recent Labs     09/22/21  0534 09/22/21  0955   TROPONINT 57* 48*       Imaging:  DX-CHEST-PORTABLE (1 VIEW)   Final Result      Ill-defined peripheral and bibasilar pulmonary opacities in keeping with Covid pneumonia.      EC-ECHOCARDIOGRAM COMPLETE W/O CONT    (Results Pending)       X-Ray:  I have personally reviewed the images and compared with prior images.  EKG:  I have personally reviewed the images and compared with prior images.    Assessment/Plan:  I anticipate this patient is appropriate for observation status at this time.    * Elevated troponin  Assessment & Plan  Myositis vs hypoxia induced  ]echocardiogram ordered  Follow serial troponin  Monitor on telemetry  Check ddimer, ESR, ddimer  Had CTA chest 9/17 with negative for PE    Pneumonia due to COVID-19  virus  Assessment & Plan  Start decadron 6mg daily x 10 days    Hyperthyroidism- (present on admission)  Assessment & Plan  Hx of Graves disease  Levothyroxine daily.      VTE prophylaxis: SCDs/TEDs and enoxaparin ppx

## 2021-09-22 NOTE — ED TRIAGE NOTES
"Chief Complaint   Patient presents with   • Chest Pain     started friday, worsening today. L side chest pain radiates to throat per pt. Was diagnosed w/ covid pnemonia last friday per pt.     Pt to R10. Steady gait. Pt changed into hospital gown.    BP (!) 174/104   Pulse (!) 103   Temp 36.7 °C (98 °F) (Temporal)   Resp 16   Ht 1.473 m (4' 10\")   Wt 45.8 kg (101 lb)   SpO2 95%   BMI 21.11 kg/m²     "

## 2021-09-22 NOTE — ED PROVIDER NOTES
ED Provider Note    CHIEF COMPLAINT  Chief Complaint   Patient presents with   • Chest Pain     started friday, worsening today. L side chest pain radiates to throat per pt. Was diagnosed w/ covid pnemonia last friday per pt.       HPI  Cheryl Sultana is a 46 y.o. female who presents with chest pain.  The patient has a known Covid infection that was diagnosed on September 13.  She was seen here again on the 17th with increasing cough and shortness of breath.  She had a CT scan of her chest that was negative for pulmonary embolism however showed signs of Covid pneumonia.  She has not had any further fevers however describes body aches as well as decreased appetite.  She does not describe significant chest pain, only really with coughing.  She has had a significant cough and shortness of breath.  She has been taking Tessalon Perles at home as well as ibuprofen without relief.    REVIEW OF SYSTEMS  See HPI for further details.   Review of Systems   Constitutional: Positive for malaise/fatigue. Negative for chills and fever.   HENT: Negative for sore throat.    Eyes: Negative for blurred vision and redness.   Respiratory: Positive for cough and shortness of breath.    Cardiovascular: Negative for chest pain and leg swelling.   Gastrointestinal: Negative for abdominal pain and vomiting.   Genitourinary: Negative for dysuria and urgency.   Musculoskeletal: Negative for back pain and neck pain.   Skin: Negative for rash.   Neurological: Negative for focal weakness, seizures and headaches.   Psychiatric/Behavioral: Negative for suicidal ideas.         PAST MEDICAL HISTORY   has a past medical history of Thyroid disease.    SOCIAL HISTORY  Social History     Tobacco Use   • Smoking status: Never Smoker   • Smokeless tobacco: Never Used   Substance and Sexual Activity   • Alcohol use: No   • Drug use: Not on file   • Sexual activity: Not on file       SURGICAL HISTORY   has a past surgical history that includes  "tubal coagulation laparoscopic bilateral.    CURRENT MEDICATIONS  Home Medications     Reviewed by Duane Lowe R.N. (Registered Nurse) on 09/22/21 at 0405  Med List Status: Partial   Medication Last Dose Status   CALCIUM CARBONATE PO  Active   carvedilol (COREG) 3.125 MG TABS  Active   levothyroxine (LEVOXYL) 50 MCG Tab  Active                ALLERGIES  Allergies   Allergen Reactions   • Methimazole Rash       PHYSICAL EXAM   VITAL SIGNS: /83   Pulse 93   Temp 36.7 °C (98 °F) (Temporal)   Resp 18   Ht 1.473 m (4' 10\")   Wt 45.8 kg (101 lb)   SpO2 94%   BMI 21.11 kg/m²      Physical Exam  Constitutional:       General: She is not in acute distress.     Comments: Nontoxic-appearing middle-age female   HENT:      Head: Normocephalic and atraumatic.   Eyes:      Conjunctiva/sclera: Conjunctivae normal.      Pupils: Pupils are equal, round, and reactive to light.   Cardiovascular:      Rate and Rhythm: Normal rate and regular rhythm.      Heart sounds: Normal heart sounds.   Pulmonary:      Effort: Pulmonary effort is normal. No respiratory distress.      Breath sounds: Normal breath sounds.      Comments: Scattered crackles throughout with normal respiratory effort  Abdominal:      General: There is no distension.      Palpations: Abdomen is soft.      Tenderness: There is no abdominal tenderness.   Musculoskeletal:         General: No tenderness. Normal range of motion.      Cervical back: Normal range of motion and neck supple.   Skin:     General: Skin is warm and dry.   Neurological:      Mental Status: She is alert and oriented to person, place, and time.      Comments: Moving all extremities spontaneously   Psychiatric:         Mood and Affect: Affect normal.           DIAGNOSTIC STUDIES    EKG  Results for orders placed or performed during the hospital encounter of 09/22/21   EKG   Result Value Ref Range    Report       [facility] Emergency Dept.    Test Date:  2021-09-21  Pt " Name:    MATT MALDONADO          Department: ER  MRN:        [id number]                      Room:  Gender:     Female                       Technician: [id number]  :        1975                   Requested By:ER TRIAGE PROTOCOL  Order #:    [id number]                    Reading MD: Ani Colin MD    Measurements  Intervals                                Axis  Rate:       91                           P:          69  ID:         128                          QRS:        28  QRSD:       81                           T:          9  QT:         397  QTc:        489    Interpretive Statements  Sinus rhythm  Borderline T abnormalities, anterior leads  Borderline prolonged QT interval  No acute ST or T wave change  Compared to ECG 2021 22:29:11  T-wave flattening in anterior leads, otherwise no change  Electronically Signed On 2021 6:08:55 PDT by Ani Colin MD             LABS  Personally reviewed by me  Labs Reviewed   CBC WITH DIFFERENTIAL - Abnormal; Notable for the following components:       Result Value    MCHC 32.8 (*)     Neutrophils-Polys 76.40 (*)     Lymphocytes 10.10 (*)     Lymphs (Absolute) 0.82 (*)     All other components within normal limits   CRP QUANTITIVE (NON-CARDIAC) - Abnormal; Notable for the following components:    Stat C-Reactive Protein 11.15 (*)     All other components within normal limits   COMP METABOLIC PANEL - Abnormal; Notable for the following components:    Calcium 8.0 (*)     Alkaline Phosphatase 110 (*)     Albumin 2.6 (*)     Total Protein 5.8 (*)     All other components within normal limits    Narrative:     SPECIMEN IS A RECOLLECT.   TROPONIN - Abnormal; Notable for the following components:    Troponin T 57 (*)     All other components within normal limits    Narrative:     SPECIMEN IS A RECOLLECT.   LACTIC ACID   DIFFERENTIAL MANUAL   PERIPHERAL SMEAR REVIEW   PLATELET ESTIMATE   MORPHOLOGY   ESTIMATED GFR    Narrative:     SPECIMEN IS A RECOLLECT.    TROPONIN   TROPONIN   D-DIMER   SED RATE           RADIOLOGY  Personally reviewed by me  DX-CHEST-PORTABLE (1 VIEW)   Final Result      Ill-defined peripheral and bibasilar pulmonary opacities in keeping with Covid pneumonia.      EC-ECHOCARDIOGRAM COMPLETE W/O CONT    (Results Pending)         ED COURSE  Vitals:    09/22/21 0402 09/22/21 0500 09/22/21 0600 09/22/21 0630   BP: (!) 174/104 (!) 167/96 154/91 156/83   Pulse: (!) 103 93 95 93   Resp: 16 18 18 18   Temp: 36.7 °C (98 °F)      TempSrc: Temporal      SpO2: 95% 94% 93% 94%   Weight:       Height:             Medications administered:  IVF, APAP    Old records personally reviewed:  Patient has been seen here twice in the past 2 weeks related to Covid infection.  She was seen on 9/17/2021 with a negative CT PE of the chest.        MEDICAL DECISION MAKING  Patient with history of thyroid disease and known Covid infection who presents with chest pain and shortness of breath.  She is afebrile, hypertensive on arrival and slightly tachycardic with otherwise reassuring vital signs.  She has no evidence of hypoxia or respiratory distress.  Her EKG does show some T wave flattening in the anterior leads which may be new from prior although not that significant.  Her troponin is elevated at 57.  Labs are otherwise reassuring without signs of sepsis including no leukocytosis or elevated lactate.  She does have elevation of her inflammatory markers.  She had a recent CTA of the chest that was negative for pulmonary embolism therefore I do not think this needs to be repeated at this time.  Due to the patient's elevated troponin though, I feel she should be admitted for trending of this as well as echocardiogram.    On reassessment, patient is resting comfortably with improved vital signs.  Her symptoms are improved following a small amount IV fluids and Tylenol.  We discussed the elevated troponin and recommendation for admission.  The patient is agreeable at this  time.    I discussed the case with Dr. Aleman, hospitalist on-call, accepts admission of the patient.        IMPRESSION  (R07.9) Chest pain, unspecified type  (R77.8) Elevated troponin  (U07.1) COVID-19 virus infection    Disposition: Admit medicine, stable condition  Results, diagnoses, and treatment options were discussed with the patient and/or family. Patient verbalized understanding of plan of care.    Patient referred to primary care provider for monitoring and treatment of blood pressure.      New Prescriptions    No medications on file           Electronically signed by: Ani Colin M.D., 9/22/2021 5:16 AM

## 2021-09-23 VITALS
TEMPERATURE: 96.7 F | BODY MASS INDEX: 21.47 KG/M2 | HEART RATE: 71 BPM | RESPIRATION RATE: 18 BRPM | WEIGHT: 102.29 LBS | HEIGHT: 58 IN | OXYGEN SATURATION: 94 % | SYSTOLIC BLOOD PRESSURE: 151 MMHG | DIASTOLIC BLOOD PRESSURE: 91 MMHG

## 2021-09-23 LAB
ANION GAP SERPL CALC-SCNC: 11 MMOL/L (ref 7–16)
BASOPHILS # BLD AUTO: 0 % (ref 0–1.8)
BASOPHILS # BLD: 0 K/UL (ref 0–0.12)
BUN SERPL-MCNC: 11 MG/DL (ref 8–22)
CALCIUM SERPL-MCNC: 8.5 MG/DL (ref 8.5–10.5)
CHLORIDE SERPL-SCNC: 101 MMOL/L (ref 96–112)
CHOLEST SERPL-MCNC: 203 MG/DL (ref 100–199)
CO2 SERPL-SCNC: 22 MMOL/L (ref 20–33)
CREAT SERPL-MCNC: 0.61 MG/DL (ref 0.5–1.4)
EOSINOPHIL # BLD AUTO: 0 K/UL (ref 0–0.51)
EOSINOPHIL NFR BLD: 0 % (ref 0–6.9)
ERYTHROCYTE [DISTWIDTH] IN BLOOD BY AUTOMATED COUNT: 41.1 FL (ref 35.9–50)
GLUCOSE SERPL-MCNC: 205 MG/DL (ref 65–99)
HCT VFR BLD AUTO: 37.8 % (ref 37–47)
HDLC SERPL-MCNC: 52 MG/DL
HGB BLD-MCNC: 12.4 G/DL (ref 12–16)
LDLC SERPL CALC-MCNC: 126 MG/DL
LYMPHOCYTES # BLD AUTO: 0.28 K/UL (ref 1–4.8)
LYMPHOCYTES NFR BLD: 3.5 % (ref 22–41)
MANUAL DIFF BLD: NORMAL
MCH RBC QN AUTO: 30.1 PG (ref 27–33)
MCHC RBC AUTO-ENTMCNC: 32.8 G/DL (ref 33.6–35)
MCV RBC AUTO: 91.7 FL (ref 81.4–97.8)
METAMYELOCYTES NFR BLD MANUAL: 1.7 %
MONOCYTES # BLD AUTO: 0.07 K/UL (ref 0–0.85)
MONOCYTES NFR BLD AUTO: 0.9 % (ref 0–13.4)
MORPHOLOGY BLD-IMP: NORMAL
MYELOCYTES NFR BLD MANUAL: 0.9 %
NEUTROPHILS # BLD AUTO: 7.53 K/UL (ref 2–7.15)
NEUTROPHILS NFR BLD: 93 % (ref 44–72)
NRBC # BLD AUTO: 0 K/UL
NRBC BLD-RTO: 0 /100 WBC
PLATELET # BLD AUTO: 438 K/UL (ref 164–446)
PLATELET BLD QL SMEAR: NORMAL
PMV BLD AUTO: 8.7 FL (ref 9–12.9)
POTASSIUM SERPL-SCNC: 4.1 MMOL/L (ref 3.6–5.5)
RBC # BLD AUTO: 4.12 M/UL (ref 4.2–5.4)
RBC BLD AUTO: NORMAL
SODIUM SERPL-SCNC: 134 MMOL/L (ref 135–145)
TRIGL SERPL-MCNC: 123 MG/DL (ref 0–149)
WBC # BLD AUTO: 8.1 K/UL (ref 4.8–10.8)

## 2021-09-23 PROCEDURE — G0378 HOSPITAL OBSERVATION PER HR: HCPCS

## 2021-09-23 PROCEDURE — 80048 BASIC METABOLIC PNL TOTAL CA: CPT

## 2021-09-23 PROCEDURE — 85007 BL SMEAR W/DIFF WBC COUNT: CPT

## 2021-09-23 PROCEDURE — 700102 HCHG RX REV CODE 250 W/ 637 OVERRIDE(OP): Performed by: HOSPITALIST

## 2021-09-23 PROCEDURE — A9270 NON-COVERED ITEM OR SERVICE: HCPCS | Performed by: HOSPITALIST

## 2021-09-23 PROCEDURE — 80061 LIPID PANEL: CPT

## 2021-09-23 PROCEDURE — 85027 COMPLETE CBC AUTOMATED: CPT

## 2021-09-23 PROCEDURE — 99217 PR OBSERVATION CARE DISCHARGE: CPT | Mod: CS | Performed by: STUDENT IN AN ORGANIZED HEALTH CARE EDUCATION/TRAINING PROGRAM

## 2021-09-23 RX ORDER — DEXAMETHASONE 6 MG/1
6 TABLET ORAL DAILY
Qty: 8 TABLET | Refills: 0 | Status: SHIPPED | OUTPATIENT
Start: 2021-09-24 | End: 2021-10-02

## 2021-09-23 RX ADMIN — CARVEDILOL 3.12 MG: 3.12 TABLET, FILM COATED ORAL at 06:44

## 2021-09-23 RX ADMIN — ASPIRIN 325 MG ORAL TABLET 325 MG: 325 PILL ORAL at 06:44

## 2021-09-23 RX ADMIN — LEVOTHYROXINE SODIUM 50 MCG: 0.05 TABLET ORAL at 06:07

## 2021-09-23 RX ADMIN — DEXAMETHASONE 6 MG: 6 TABLET ORAL at 06:44

## 2021-09-23 ASSESSMENT — PAIN DESCRIPTION - PAIN TYPE: TYPE: ACUTE PAIN

## 2021-09-23 NOTE — PROGRESS NOTES
Pt hypertensive, and refusing to take prescribed carvedilol d/t previous negative reaction. Educated pt on importance of keeping BP away from hypertensive parameters and alternatives to refused medications. Pt states understanding. MD notified.

## 2021-09-23 NOTE — DISCHARGE INSTRUCTIONS
Discharge Instructions    Discharged to home by car with relative. Discharged via wheelchair, hospital escort: Yes.  Special equipment needed: Not Applicable    Be sure to schedule a follow-up appointment with your primary care doctor or any specialists as instructed.     Discharge Plan:   Diet Plan: Discussed  Activity Level: Discussed  Confirmed Follow up Appointment: Patient to Call and Schedule Appointment  Confirmed Symptoms Management: Discussed  Medication Reconciliation Updated: Yes  Influenza Vaccine Indication: Patient Refuses    I understand that a diet low in cholesterol, fat, and sodium is recommended for good health. Unless I have been given specific instructions below for another diet, I accept this instruction as my diet prescription.   Other diet: regular    Special Instructions: None    · Is patient discharged on Warfarin / Coumadin?   No     Depression / Suicide Risk    As you are discharged from this [facility] facility, it is important to learn how to keep safe from harming yourself.    Recognize the warning signs:  · Abrupt changes in personality, positive or negative- including increase in energy   · Giving away possessions  · Change in eating patterns- significant weight changes-  positive or negative  · Change in sleeping patterns- unable to sleep or sleeping all the time   · Unwillingness or inability to communicate  · Depression  · Unusual sadness, discouragement and loneliness  · Talk of wanting to die  · Neglect of personal appearance   · Rebelliousness- reckless behavior  · Withdrawal from people/activities they love  · Confusion- inability to concentrate     If you or a loved one observes any of these behaviors or has concerns about self-harm, here's what you can do:  · Talk about it- your feelings and reasons for harming yourself  · Remove any means that you might use to hurt yourself (examples: pills, rope, extension cords, firearm)  · Get professional help from the community  (Mental Health, Substance Abuse, psychological counseling)  · Do not be alone:Call your Safe Contact- someone whom you trust who will be there for you.  · Call your local CRISIS HOTLINE 305-2272 or 906-277-2137  · Call your local Children's Mobile Crisis Response Team [place] (744) 387-1640 or www.[employer]  · Call the toll free National Suicide Prevention Hotlines   · National Suicide Prevention Lifeline 757-461-BUPV (1716)  · [employer] Hope Line Network 800-SUICIDE (068-5978)      COVID-19  COVID-19 is a respiratory infection that is caused by a virus called severe acute respiratory syndrome coronavirus 2 (SARS-CoV-2). The disease is also known as coronavirus disease or novel coronavirus. In some people, the virus may not cause any symptoms. In others, it may cause a serious infection. The infection can get worse quickly and can lead to complications, such as:  · Pneumonia, or infection of the lungs.  · Acute respiratory distress syndrome or ARDS. This is fluid build-up in the lungs.  · Acute respiratory failure. This is a condition in which there is not enough oxygen passing from the lungs to the body.  · Sepsis or septic shock. This is a serious bodily reaction to an infection.  · Blood clotting problems.  · Secondary infections due to bacteria or fungus.  The virus that causes COVID-19 is contagious. This means that it can spread from person to person through droplets from coughs and sneezes (respiratory secretions).  What are the causes?  This illness is caused by a virus. You may catch the virus by:  · Breathing in droplets from an infected person's cough or sneeze.  · Touching something, like a table or a doorknob, that was exposed to the virus (contaminated) and then touching your mouth, nose, or eyes.  What increases the risk?  Risk for infection  You are more likely to be infected with this virus if you:  · Live in or travel to an area with a COVID-19 outbreak.  · Come in contact with a sick  person who recently traveled to an area with a COVID-19 outbreak.  · Provide care for or live with a person who is infected with COVID-19.  Risk for serious illness  You are more likely to become seriously ill from the virus if you:  · Are 65 years of age or older.  · Have a long-term disease that lowers your body's ability to fight infection (immunocompromised).  · Live in a nursing home or long-term care facility.  · Have a long-term (chronic) disease such as:  ? Chronic lung disease, including chronic obstructive pulmonary disease or asthma  ? Heart disease.  ? Diabetes.  ? Chronic kidney disease.  ? Liver disease.  · Are obese.  What are the signs or symptoms?  Symptoms of this condition can range from mild to severe. Symptoms may appear any time from 2 to 14 days after being exposed to the virus. They include:  · A fever.  · A cough.  · Difficulty breathing.  · Chills.  · Muscle pains.  · A sore throat.  · Loss of taste or smell.  Some people may also have stomach problems, such as nausea, vomiting, or diarrhea.  Other people may not have any symptoms of COVID-19.  How is this diagnosed?  This condition may be diagnosed based on:  · Your signs and symptoms, especially if:  ? You live in an area with a COVID-19 outbreak.  ? You recently traveled to or from an area where the virus is common.  ? You provide care for or live with a person who was diagnosed with COVID-19.  · A physical exam.  · Lab tests, which may include:  ? A nasal swab to take a sample of fluid from your nose.  ? A throat swab to take a sample of fluid from your throat.  ? A sample of mucus from your lungs (sputum).  ? Blood tests.  · Imaging tests, which may include, X-rays, CT scan, or ultrasound.  How is this treated?  At present, there is no medicine to treat COVID-19. Medicines that treat other diseases are being used on a trial basis to see if they are effective against COVID-19.  Your health care provider will talk with you about ways to  treat your symptoms. For most people, the infection is mild and can be managed at home with rest, fluids, and over-the-counter medicines.  Treatment for a serious infection usually takes places in a hospital intensive care unit (ICU). It may include one or more of the following treatments. These treatments are given until your symptoms improve.  · Receiving fluids and medicines through an IV.  · Supplemental oxygen. Extra oxygen is given through a tube in the nose, a face mask, or a vyas.  · Positioning you to lie on your stomach (prone position). This makes it easier for oxygen to get into the lungs.  · Continuous positive airway pressure (CPAP) or bi-level positive airway pressure (BPAP) machine. This treatment uses mild air pressure to keep the airways open. A tube that is connected to a motor delivers oxygen to the body.  · Ventilator. This treatment moves air into and out of the lungs by using a tube that is placed in your windpipe.  · Tracheostomy. This is a procedure to create a hole in the neck so that a breathing tube can be inserted.  · Extracorporeal membrane oxygenation (ECMO). This procedure gives the lungs a chance to recover by taking over the functions of the heart and lungs. It supplies oxygen to the body and removes carbon dioxide.  Follow these instructions at home:  Lifestyle  · If you are sick, stay home except to get medical care. Your health care provider will tell you how long to stay home. Call your health care provider before you go for medical care.  · Rest at home as told by your health care provider.  · Do not use any products that contain nicotine or tobacco, such as cigarettes, e-cigarettes, and chewing tobacco. If you need help quitting, ask your health care provider.  · Return to your normal activities as told by your health care provider. Ask your health care provider what activities are safe for you.  General instructions  · Take over-the-counter and prescription medicines only as  told by your health care provider.  · Drink enough fluid to keep your urine pale yellow.  · Keep all follow-up visits as told by your health care provider. This is important.  How is this prevented?    There is no vaccine to help prevent COVID-19 infection. However, there are steps you can take to protect yourself and others from this virus.  To protect yourself:   · Do not travel to areas where COVID-19 is a risk. The areas where COVID-19 is reported change often. To identify high-risk areas and travel restrictions, check the CDC travel website: wwwnc.cdc.gov/travel/notices  · If you live in, or must travel to, an area where COVID-19 is a risk, take precautions to avoid infection.  ? Stay away from people who are sick.  ? Wash your hands often with soap and water for 20 seconds. If soap and water are not available, use an alcohol-based hand .  ? Avoid touching your mouth, face, eyes, or nose.  ? Avoid going out in public, follow guidance from your state and local health authorities.  ? If you must go out in public, wear a cloth face covering or face mask.  ? Disinfect objects and surfaces that are frequently touched every day. This may include:  § Counters and tables.  § Doorknobs and light switches.  § Sinks and faucets.  § Electronics, such as phones, remote controls, keyboards, computers, and tablets.  To protect others:  If you have symptoms of COVID-19, take steps to prevent the virus from spreading to others.  · If you think you have a COVID-19 infection, contact your health care provider right away. Tell your health care team that you think you may have a COVID-19 infection.  · Stay home. Leave your house only to seek medical care. Do not use public transport.  · Do not travel while you are sick.  · Wash your hands often with soap and water for 20 seconds. If soap and water are not available, use alcohol-based hand .  · Stay away from other members of your household. Let healthy household  members care for children and pets, if possible. If you have to care for children or pets, wash your hands often and wear a mask. If possible, stay in your own room, separate from others. Use a different bathroom.  · Make sure that all people in your household wash their hands well and often.  · Cough or sneeze into a tissue or your sleeve or elbow. Do not cough or sneeze into your hand or into the air.  · Wear a cloth face covering or face mask.  Where to find more information  · Centers for Disease Control and Prevention: www.cdc.gov/coronavirus/2019-ncov/index.html  · World Health Organization: www.who.int/health-topics/coronavirus  Contact a health care provider if:  · You live in or have traveled to an area where COVID-19 is a risk and you have symptoms of the infection.  · You have had contact with someone who has COVID-19 and you have symptoms of the infection.  Get help right away if:  · You have trouble breathing.  · You have pain or pressure in your chest.  · You have confusion.  · You have bluish lips and fingernails.  · You have difficulty waking from sleep.  · You have symptoms that get worse.  These symptoms may represent a serious problem that is an emergency. Do not wait to see if the symptoms will go away. Get medical help right away. Call your local emergency services (911 in the [place] Do not drive yourself to the hospital. Let the emergency medical personnel know if you think you have COVID-19.  Summary  · COVID-19 is a respiratory infection that is caused by a virus. It is also known as coronavirus disease or novel coronavirus. It can cause serious infections, such as pneumonia, acute respiratory distress syndrome, acute respiratory failure, or sepsis.  · The virus that causes COVID-19 is contagious. This means that it can spread from person to person through droplets from coughs and sneezes.  · You are more likely to develop a serious illness if you are 65 years of age or older, have a weak  immunity, live in a nursing home, or have chronic disease.  · There is no medicine to treat COVID-19. Your health care provider will talk with you about ways to treat your symptoms.  · Take steps to protect yourself and others from infection. Wash your hands often and disinfect objects and surfaces that are frequently touched every day. Stay away from people who are sick and wear a mask if you are sick.  This information is not intended to replace advice given to you by your health care provider. Make sure you discuss any questions you have with your health care provider.  Document Released: 01/23/2020 Document Revised: 05/14/2020 Document Reviewed: 01/23/2020  Elsevier Patient Education © 2020 [employer]      COVID-19 Frequently Asked Questions  COVID-19 (coronavirus disease) is an infection that is caused by a large family of viruses. Some viruses cause illness in people and others cause illness in animals like camels, cats, and bats. In some cases, the viruses that cause illness in animals can spread to humans.  Where did the coronavirus come from?  In December 2019, [place] told the World Health Organization (WHO) of several cases of lung disease (human respiratory illness). These cases were linked to an open seafood and livestock market in the city of [facility]. The link to the seafood and livestock market suggests that the virus may have spread from animals to humans. However, since that first outbreak in December, the virus has also been shown to spread from person to person.  What is the name of the disease and the virus?  Disease name  Early on, this disease was called novel coronavirus. This is because scientists determined that the disease was caused by a new (novel) respiratory virus. The World Health Organization (WHO) has now named the disease COVID-19, or coronavirus disease.  Virus name  The virus that causes the disease is called severe acute respiratory syndrome coronavirus 2 (SARS-CoV-2).  More  information on disease and virus naming  World Health Organization (WHO): www.who.int/emergencies/diseases/novel-coronavirus-2019/technical-guidance/naming-the-coronavirus-disease-(covid-2019)-and-the-virus-that-causes-it  Who is at risk for complications from coronavirus disease?  Some people may be at higher risk for complications from coronavirus disease. This includes older adults and people who have chronic diseases, such as heart disease, diabetes, and lung disease.  If you are at higher risk for complications, take these extra precautions:  · Avoid close contact with people who are sick or have a fever or cough. Stay at least 3-6 ft (1-2 m) away from them, if possible.  · Wash your hands often with soap and water for at least 20 seconds.  · Avoid touching your face, mouth, nose, or eyes.  · Keep supplies on hand at home, such as food, medicine, and cleaning supplies.  · Stay home as much as possible.  · Avoid social gatherings and travel.  How does coronavirus disease spread?  The virus that causes coronavirus disease spreads easily from person to person (is contagious). There are also cases of community-spread disease. This means the disease has spread to:  · People who have no known contact with other infected people.  · People who have not traveled to areas where there are known cases.  It appears to spread from one person to another through droplets from coughing or sneezing.  Can I get the virus from touching surfaces or objects?  There is still a lot that we do not know about the virus that causes coronavirus disease. Scientists are basing a lot of information on what they know about similar viruses, such as:  · Viruses cannot generally survive on surfaces for long. They need a human body (host) to survive.  · It is more likely that the virus is spread by close contact with people who are sick (direct contact), such as through:  ? Shaking hands or hugging.  ? Breathing in respiratory droplets that  travel through the air. This can happen when an infected person coughs or sneezes on or near other people.  · It is less likely that the virus is spread when a person touches a surface or object that has the virus on it (indirect contact). The virus may be able to enter the body if the person touches a surface or object and then touches his or her face, eyes, nose, or mouth.  Can a person spread the virus without having symptoms of the disease?  It may be possible for the virus to spread before a person has symptoms of the disease, but this is most likely not the main way the virus is spreading. It is more likely for the virus to spread by being in close contact with people who are sick and breathing in the respiratory droplets of a sick person's cough or sneeze.  What are the symptoms of coronavirus disease?  Symptoms vary from person to person and can range from mild to severe. Symptoms may include:  · Fever.  · Cough.  · Tiredness, weakness, or fatigue.  · Fast breathing or feeling short of breath.  These symptoms can appear anywhere from 2 to 14 days after you have been exposed to the virus. If you develop symptoms, call your health care provider. People with severe symptoms may need hospital care.  If I am exposed to the virus, how long does it take before symptoms start?  Symptoms of coronavirus disease may appear anywhere from 2 to 14 days after a person has been exposed to the virus. If you develop symptoms, call your health care provider.  Should I be tested for this virus?  Your health care provider will decide whether to test you based on your symptoms, history of exposure, and your risk factors.  How does a health care provider test for this virus?  Health care providers will collect samples to send for testing. Samples may include:  · Taking a swab of fluid from the nose.  · Taking fluid from the lungs by having you cough up mucus (sputum) into a sterile cup.  · Taking a blood sample.  · Taking a stool  or urine sample.  Is there a treatment or vaccine for this virus?  Currently, there is no vaccine to prevent coronavirus disease. Also, there are no medicines like antibiotics or antivirals to treat the virus. A person who becomes sick is given supportive care, which means rest and fluids. A person may also relieve his or her symptoms by using over-the-counter medicines that treat sneezing, coughing, and runny nose. These are the same medicines that a person takes for the common cold.  If you develop symptoms, call your health care provider. People with severe symptoms may need hospital care.  What can I do to protect myself and my family from this virus?         You can protect yourself and your family by taking the same actions that you would take to prevent the spread of other viruses. Take the following actions:  · Wash your hands often with soap and water for at least 20 seconds. If soap and water are not available, use alcohol-based hand .  · Avoid touching your face, mouth, nose, or eyes.  · Cough or sneeze into a tissue, sleeve, or elbow. Do not cough or sneeze into your hand or the air.  ? If you cough or sneeze into a tissue, throw it away immediately and wash your hands.  · Disinfect objects and surfaces that you frequently touch every day.  · Avoid close contact with people who are sick or have a fever or cough. Stay at least 3-6 ft (1-2 m) away from them, if possible.  · Stay home if you are sick, except to get medical care. Call your health care provider before you get medical care.  · Make sure your vaccines are up to date. Ask your health care provider what vaccines you need.  What should I do if I need to travel?  Follow travel recommendations from your local health authority, the CDC, and WHO.  Travel information and advice  · Centers for Disease Control and Prevention (CDC): www.cdc.gov/coronavirus/2019-ncov/travelers/index.html  · World Health Organization (WHO):  www.who.int/emergencies/diseases/novel-coronavirus-2019/travel-advice  Know the risks and take action to protect your health  · You are at higher risk of getting coronavirus disease if you are traveling to areas with an outbreak or if you are exposed to travelers from areas with an outbreak.  · Wash your hands often and practice good hygiene to lower the risk of catching or spreading the virus.  What should I do if I am sick?  General instructions to stop the spread of infection  · Wash your hands often with soap and water for at least 20 seconds. If soap and water are not available, use alcohol-based hand .  · Cough or sneeze into a tissue, sleeve, or elbow. Do not cough or sneeze into your hand or the air.  · If you cough or sneeze into a tissue, throw it away immediately and wash your hands.  · Stay home unless you must get medical care. Call your health care provider or local health authority before you get medical care.  · Avoid public areas. Do not take public transportation, if possible.  · If you can, wear a mask if you must go out of the house or if you are in close contact with someone who is not sick.  Keep your home clean  · Disinfect objects and surfaces that are frequently touched every day. This may include:  ? Counters and tables.  ? Doorknobs and light switches.  ? Sinks and faucets.  ? Electronics such as phones, remote controls, keyboards, computers, and tablets.  · Wash dishes in hot, soapy water or use a . Air-dry your dishes.  · Wash laundry in hot water.  Prevent infecting other household members  · Let healthy household members care for children and pets, if possible. If you have to care for children or pets, wash your hands often and wear a mask.  · Sleep in a different bedroom or bed, if possible.  · Do not share personal items, such as razors, toothbrushes, deodorant, gentile, brushes, towels, and washcloths.  Where to find more information  Centers for Disease Control and  Prevention (CDC)  · Information and news updates: www.cdc.gov/coronavirus/2019-ncov  World Health Organization (WHO)  · Information and news updates: www.who.int/emergencies/diseases/novel-coronavirus-2019  · Coronavirus health topic: www.who.int/health-topics/coronavirus  · Questions and answers on COVID-19: www.who.int/news-room/q-a-detail/s-s-pdckdkwqrgeyg  · Global tracker: [employer]  American Academy of Pediatrics (AAP)  · Information for families: www.healthychildren.org/English/health-issues/conditions/chest-lungs/Pages/2019-Novel-Coronavirus.aspx  The coronavirus situation is changing rapidly. Check your local health authority website or the CDC and WHO websites for updates and news.  When should I contact a health care provider?  · Contact your health care provider if you have symptoms of an infection, such as fever or cough, and you:  ? Have been near anyone who is known to have coronavirus disease.  ? Have come into contact with a person who is suspected to have coronavirus disease.  ? Have traveled outside of the country.  When should I get emergency medical care?  · Get help right away by calling your local emergency services (911 in the [place] if you have:  ? Trouble breathing.  ? Pain or pressure in your chest.  ? Confusion.  ? Blue-tinged lips and fingernails.  ? Difficulty waking from sleep.  ? Symptoms that get worse.  Let the emergency medical personnel know if you think you have coronavirus disease.  Summary  · A new respiratory virus is spreading from person to person and causing COVID-19 (coronavirus disease).  · The virus that causes COVID-19 appears to spread easily. It spreads from one person to another through droplets from coughing or sneezing.  · Older adults and those with chronic diseases are at higher risk of disease. If you are at higher risk for complications, take extra precautions.  · There is currently no vaccine to prevent coronavirus disease. There are no medicines, such  as antibiotics or antivirals, to treat the virus.  · You can protect yourself and your family by washing your hands often, avoiding touching your face, and covering your coughs and sneezes.  This information is not intended to replace advice given to you by your health care provider. Make sure you discuss any questions you have with your health care provider.  Document Released: 04/14/2020 Document Revised: 04/14/2020 Document Reviewed: 04/14/2020  Elsevier Patient Education © 2020 [employer]

## 2021-09-23 NOTE — DISCHARGE SUMMARY
Discharge Summary    CHIEF COMPLAINT ON ADMISSION  Chief Complaint   Patient presents with   • Chest Pain     started friday, worsening today. L side chest pain radiates to throat per pt. Was diagnosed w/ covid pnemonia last friday per pt.       Reason for Admission  Cough/Chest Pain     Admission Date  9/22/2021    CODE STATUS  Full Code    HPI & HOSPITAL COURSE  Cheryl Sultana is a 46 y.o. female with hypothyroid who presented 9/22/2021 with chest pain and current known COVID 19 pneumonia for ~7 days.  She as been in the hospital lately and had a CTA chest 4 days ago w/o PE.  She presents today with chest pain upon coughing.  Her troponin is elevated and EKG shows mild flattening of the ST segments. She is denies any family/personel history of blood clots to legs/lungs.     9/23: Patient seen examined at bedside.  She reports her chest pain is improved after having started on the steroids.  She is not found anything that really made the chest pain better or worse prior to the steroids.  Suspect that the chest pain is likely myocarditis or pericarditis.  She will be discharged home on 8 days of Decadron.  Patient's blood pressure has been elevated she reports has had issues with this in the past but also every time they started her on blood pressure medication she would then become hypotensive.  She will need to follow-up with her primary care provider to evaluate whether she should be restarted on additional blood pressure meds.    Therefore, she is discharged in good and stable condition to home with close outpatient follow-up.    The patient recovered much more quickly than anticipated on admission.    Discharge Date  9/23/21    FOLLOW UP ITEMS POST DISCHARGE  covid  Elevated blood pressure    DISCHARGE DIAGNOSES  Principal Problem:    Elevated troponin POA: Unknown  Active Problems:    Hyperthyroidism POA: Yes    Pneumonia due to COVID-19 virus POA: Unknown  Resolved Problems:    * No resolved  hospital problems. *      FOLLOW UP  No future appointments.  LYNDA Nesbitt  [address] [facility] [id number] [phone]    Schedule an appointment as soon as possible for a visit in 1 week        MEDICATIONS ON DISCHARGE     Medication List      START taking these medications      Instructions   dexamethasone 6 MG Tabs  Start taking on: September 24, 2021  Commonly known as: DECADRON   Take 1 Tablet by mouth every day for 8 days.  Dose: 6 mg        CONTINUE taking these medications      Instructions   chlorthalidone 25 MG Tabs  Commonly known as: HYGROTON   Take 25 mg by mouth every day.  Dose: 25 mg     ibuprofen 200 MG Tabs  Commonly known as: MOTRIN   Take 400 mg by mouth every 6 hours as needed for Mild Pain.  Dose: 400 mg     levothyroxine 25 MCG Tabs  Commonly known as: SYNTHROID   Take 37.5-50 mcg by mouth every morning on an empty stomach. Pt alternates taking either 50 mcg (2 tablets) or 37.5 mcg (1.5 tablets) every other day  Yesterdays dose was 50 mcg (2 tablets)  Dose: 37.5-50 mcg     liothyronine 5 MCG Tabs  Commonly known as: CYTOMEL   Take 10 mcg by mouth every day. 2 tablets = 10 mcg  Dose: 10 mcg            Allergies  Allergies   Allergen Reactions   • Methimazole Rash       DIET  Orders Placed This Encounter   Procedures   • Diet Order Diet: Cardiac     Standing Status:   Standing     Number of Occurrences:   1     Order Specific Question:   Diet:     Answer:   Cardiac [6]       ACTIVITY  As tolerated.  Weight bearing as tolerated    CONSULTATIONS  none    PROCEDURES  none    LABORATORY  Lab Results   Component Value Date    SODIUM 134 (L) 09/23/2021    POTASSIUM 4.1 09/23/2021    CHLORIDE 101 09/23/2021    CO2 22 09/23/2021    GLUCOSE 205 (H) 09/23/2021    BUN 11 09/23/2021    CREATININE 0.61 09/23/2021        Lab Results   Component Value Date    WBC 8.1 09/23/2021    HEMOGLOBIN 12.4 09/23/2021    HEMATOCRIT 37.8 09/23/2021    PLATELETCT 438 09/23/2021        Total time of the  discharge process exceeds 33 minutes.

## 2021-10-08 ENCOUNTER — OFFICE VISIT (OUTPATIENT)
Dept: URGENT CARE | Facility: CLINIC | Age: 46
End: 2021-10-08
Payer: COMMERCIAL

## 2021-10-08 VITALS
TEMPERATURE: 97.4 F | SYSTOLIC BLOOD PRESSURE: 106 MMHG | HEART RATE: 80 BPM | RESPIRATION RATE: 18 BRPM | WEIGHT: 102 LBS | OXYGEN SATURATION: 96 % | BODY MASS INDEX: 21.41 KG/M2 | DIASTOLIC BLOOD PRESSURE: 60 MMHG | HEIGHT: 58 IN

## 2021-10-08 DIAGNOSIS — U07.1 PNEUMONIA DUE TO COVID-19 VIRUS: ICD-10-CM
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
## 2021-10-08 DIAGNOSIS — M62.830 LUMBAR PARASPINAL MUSCLE SPASM: ICD-10-CM

## 2021-10-08 DIAGNOSIS — J12.82 PNEUMONIA DUE TO COVID-19 VIRUS: ICD-10-CM

## 2021-10-08 PROCEDURE — 99214 OFFICE O/P EST MOD 30 MIN: CPT | Mod: CS | Performed by: PHYSICIAN ASSISTANT

## 2021-10-08 ASSESSMENT — ENCOUNTER SYMPTOMS
WEIGHT LOSS: 0
BLURRED VISION: 0
WHEEZING: 0
WEAKNESS: 0
HEADACHES: 0
BACK PAIN: 1
CHILLS: 0
MYALGIAS: 0
DIZZINESS: 0
SHORTNESS OF BREATH: 0
DOUBLE VISION: 0
DIAPHORESIS: 0
SINUS PAIN: 0
SORE THROAT: 0
NECK PAIN: 0
LOSS OF CONSCIOUSNESS: 0
NAUSEA: 0
PALPITATIONS: 0
FEVER: 0
VOMITING: 0
DIARRHEA: 0
SPUTUM PRODUCTION: 0
ABDOMINAL PAIN: 0
COUGH: 1
TINGLING: 0

## 2021-10-08 ASSESSMENT — FIBROSIS 4 INDEX: FIB4 SCORE: 0.61
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
## 2021-12-09 ENCOUNTER — OFFICE VISIT (OUTPATIENT)
Dept: URGENT CARE | Facility: CLINIC | Age: 46
End: 2021-12-09
Payer: COMMERCIAL

## 2021-12-09 VITALS
HEART RATE: 93 BPM | HEIGHT: 58 IN | SYSTOLIC BLOOD PRESSURE: 152 MMHG | BODY MASS INDEX: 21.83 KG/M2 | OXYGEN SATURATION: 98 % | WEIGHT: 104 LBS | DIASTOLIC BLOOD PRESSURE: 100 MMHG | TEMPERATURE: 98.4 F | RESPIRATION RATE: 16 BRPM

## 2021-12-09 DIAGNOSIS — Z87.01 HISTORY OF PNEUMONIA: ICD-10-CM

## 2021-12-09 DIAGNOSIS — J01.00 ACUTE NON-RECURRENT MAXILLARY SINUSITIS: ICD-10-CM

## 2021-12-09 PROCEDURE — 99214 OFFICE O/P EST MOD 30 MIN: CPT | Performed by: PHYSICIAN ASSISTANT

## 2021-12-09 RX ORDER — FLUTICASONE PROPIONATE 50 MCG
1 SPRAY, SUSPENSION (ML) NASAL DAILY
Qty: 16 G | Refills: 0 | Status: SHIPPED | OUTPATIENT
Start: 2021-12-09 | End: 2022-11-01

## 2021-12-09 RX ORDER — AMOXICILLIN AND CLAVULANATE POTASSIUM 875; 125 MG/1; MG/1
1 TABLET, FILM COATED ORAL 2 TIMES DAILY
Qty: 14 TABLET | Refills: 0 | Status: SHIPPED | OUTPATIENT
Start: 2021-12-09 | End: 2021-12-16

## 2021-12-09 ASSESSMENT — FIBROSIS 4 INDEX: FIB4 SCORE: 0.61

## 2021-12-09 ASSESSMENT — ENCOUNTER SYMPTOMS
SINUS PRESSURE: 1
HEADACHES: 1
SORE THROAT: 0
COUGH: 1

## 2021-12-10 NOTE — PROGRESS NOTES
Subjective:   Cheryl Sultana is a 46 y.o. female who presents for Sinus Problem (congestion/runny nose, sinus pressure, teeth hurting, headache, bloody mucus)        Sinusitis  This is a new problem. The current episode started in the past 7 days. The problem has been rapidly worsening (sudden worsening with severe sinus pressure) since onset. There has been no fever. The pain is severe. Associated symptoms include congestion, coughing (coughing up mucous. occasionally blood tinged), headaches and sinus pressure (maxillary, bilat). Pertinent negatives include no sore throat. (Upper teeth hurt) Past treatments include oral decongestants. The treatment provided no relief.     Review of Systems   HENT: Positive for congestion and sinus pressure (maxillary, bilat). Negative for sore throat.    Respiratory: Positive for cough (coughing up mucous. occasionally blood tinged).    Neurological: Positive for headaches.       PMH:  has a past medical history of Thyroid disease.  MEDS:   Current Outpatient Medications:   •  amoxicillin-clavulanate (AUGMENTIN) 875-125 MG Tab, Take 1 Tablet by mouth 2 times a day for 7 days., Disp: 14 Tablet, Rfl: 0  •  fluticasone (FLONASE) 50 MCG/ACT nasal spray, Administer 1 Spray into affected nostril(S) every day., Disp: 16 g, Rfl: 0  •  liothyronine (CYTOMEL) 5 MCG Tab, Take 10 mcg by mouth every day. 2 tablets = 10 mcg, Disp: , Rfl:   •  levothyroxine (SYNTHROID) 25 MCG Tab, Take 37.5-50 mcg by mouth every morning on an empty stomach. Pt alternates taking either 50 mcg (2 tablets) or 37.5 mcg (1.5 tablets) every other day Yesterdays dose was 50 mcg (2 tablets), Disp: , Rfl:   •  ibuprofen (MOTRIN) 200 MG Tab, Take 400 mg by mouth every 6 hours as needed for Mild Pain. (Patient not taking: Reported on 10/8/2021), Disp: , Rfl:   •  chlorthalidone (HYGROTON) 25 MG Tab, Take 25 mg by mouth every day. (Patient not taking: Reported on 12/9/2021), Disp: , Rfl:   ALLERGIES:   Allergies  "  Allergen Reactions   • Methimazole Rash     SURGHX:   Past Surgical History:   Procedure Laterality Date   • TUBAL COAGULATION LAPAROSCOPIC BILATERAL       SOCHX:  reports that she has never smoked. She has never used smokeless tobacco. She reports that she does not drink alcohol and does not use drugs.  FH: Family history was reviewed, no pertinent findings to report   Objective:   /100   Pulse 93   Temp 36.9 °C (98.4 °F)   Resp 16   Ht 1.473 m (4' 10\")   Wt 47.2 kg (104 lb)   SpO2 98%   BMI 21.74 kg/m²   Physical Exam  Vitals reviewed.   Constitutional:       General: She is not in acute distress.     Appearance: Normal appearance. She is well-developed. She is not toxic-appearing.   HENT:      Head: Normocephalic and atraumatic.      Right Ear: Tympanic membrane, ear canal and external ear normal.      Left Ear: Tympanic membrane, ear canal and external ear normal.      Nose: Mucosal edema and congestion present. No rhinorrhea.      Right Sinus: Maxillary sinus tenderness present.      Left Sinus: Maxillary sinus tenderness present.      Mouth/Throat:      Lips: Pink.      Mouth: Mucous membranes are moist.      Pharynx: Oropharynx is clear. Uvula midline.   Cardiovascular:      Rate and Rhythm: Normal rate and regular rhythm.      Heart sounds: Normal heart sounds, S1 normal and S2 normal.   Pulmonary:      Effort: Pulmonary effort is normal. No respiratory distress.      Breath sounds: Normal breath sounds. No stridor. No decreased breath sounds, wheezing, rhonchi or rales.   Skin:     General: Skin is dry.   Neurological:      Comments: Alert and oriented. CN2-12 grossly intact   Psychiatric:         Speech: Speech normal.         Behavior: Behavior normal.           Assessment/Plan:   1. Acute non-recurrent maxillary sinusitis  - amoxicillin-clavulanate (AUGMENTIN) 875-125 MG Tab; Take 1 Tablet by mouth 2 times a day for 7 days.  Dispense: 14 Tablet; Refill: 0  - fluticasone (FLONASE) 50 " MCG/ACT nasal spray; Administer 1 Spray into affected nostril(S) every day.  Dispense: 16 g; Refill: 0    2. History of pneumonia    Patient has concerns about possible recurrence of pneumonia as she is coughing up blood-tinged mucus in the morning.  I suspect that this is actually postnasal drip secondary to sinus infection.  Her lungs are clear to auscultation, she is afebrile and she is satting at 98%.  Clinical suspicion for lower respiratory tract infection is low at this time. Clinical suspicion for PE is low.    Patient started on antibiotic therapy, nasal saline rinses twice daily and Flonase daily.  If no improvement in 72 hours, new symptoms develop at any point, or symptoms worsen I would like her to see her PCP or return to clinic for reevaluation.  Severe symptoms-to ED.    Differential diagnosis, natural history, supportive care, and indications for immediate follow-up discussed.

## 2022-04-12 ENCOUNTER — HOSPITAL ENCOUNTER (EMERGENCY)
Facility: MEDICAL CENTER | Age: 47
End: 2022-04-13
Attending: EMERGENCY MEDICINE
Payer: COMMERCIAL

## 2022-04-12 ENCOUNTER — APPOINTMENT (OUTPATIENT)
Dept: RADIOLOGY | Facility: MEDICAL CENTER | Age: 47
End: 2022-04-12
Attending: EMERGENCY MEDICINE
Payer: COMMERCIAL

## 2022-04-12 DIAGNOSIS — I10 HYPERTENSION, UNSPECIFIED TYPE: ICD-10-CM

## 2022-04-12 DIAGNOSIS — G43.809 OTHER MIGRAINE WITHOUT STATUS MIGRAINOSUS, NOT INTRACTABLE: ICD-10-CM

## 2022-04-12 DIAGNOSIS — R31.9 HEMATURIA, UNSPECIFIED TYPE: ICD-10-CM

## 2022-04-12 DIAGNOSIS — R10.9 FLANK PAIN: ICD-10-CM

## 2022-04-12 LAB
ALBUMIN SERPL BCP-MCNC: 4.2 G/DL (ref 3.2–4.9)
ALBUMIN/GLOB SERPL: 1.6 G/DL
ALP SERPL-CCNC: 59 U/L (ref 30–99)
ALT SERPL-CCNC: 16 U/L (ref 2–50)
ANION GAP SERPL CALC-SCNC: 11 MMOL/L (ref 7–16)
APPEARANCE UR: CLEAR
AST SERPL-CCNC: 19 U/L (ref 12–45)
BACTERIA #/AREA URNS HPF: NEGATIVE /HPF
BASOPHILS # BLD AUTO: 1.1 % (ref 0–1.8)
BASOPHILS # BLD: 0.08 K/UL (ref 0–0.12)
BILIRUB SERPL-MCNC: 0.3 MG/DL (ref 0.1–1.5)
BILIRUB UR QL STRIP.AUTO: NEGATIVE
BUN SERPL-MCNC: 19 MG/DL (ref 8–22)
CALCIUM SERPL-MCNC: 9.2 MG/DL (ref 8.5–10.5)
CHLORIDE SERPL-SCNC: 102 MMOL/L (ref 96–112)
CO2 SERPL-SCNC: 25 MMOL/L (ref 20–33)
COLOR UR: YELLOW
CREAT SERPL-MCNC: 0.85 MG/DL (ref 0.5–1.4)
EOSINOPHIL # BLD AUTO: 0.3 K/UL (ref 0–0.51)
EOSINOPHIL NFR BLD: 4.1 % (ref 0–6.9)
EPI CELLS #/AREA URNS HPF: NEGATIVE /HPF
ERYTHROCYTE [DISTWIDTH] IN BLOOD BY AUTOMATED COUNT: 43.2 FL (ref 35.9–50)
GFR SERPLBLD CREATININE-BSD FMLA CKD-EPI: 84 ML/MIN/1.73 M 2
GLOBULIN SER CALC-MCNC: 2.7 G/DL (ref 1.9–3.5)
GLUCOSE SERPL-MCNC: 111 MG/DL (ref 65–99)
GLUCOSE UR STRIP.AUTO-MCNC: NEGATIVE MG/DL
HCG SERPL QL: NEGATIVE
HCT VFR BLD AUTO: 49.1 % (ref 37–47)
HGB BLD-MCNC: 16.4 G/DL (ref 12–16)
HYALINE CASTS #/AREA URNS LPF: ABNORMAL /LPF
IMM GRANULOCYTES # BLD AUTO: 0.09 K/UL (ref 0–0.11)
IMM GRANULOCYTES NFR BLD AUTO: 1.2 % (ref 0–0.9)
KETONES UR STRIP.AUTO-MCNC: NEGATIVE MG/DL
LEUKOCYTE ESTERASE UR QL STRIP.AUTO: NEGATIVE
LYMPHOCYTES # BLD AUTO: 1.85 K/UL (ref 1–4.8)
LYMPHOCYTES NFR BLD: 25.2 % (ref 22–41)
MCH RBC QN AUTO: 30.8 PG (ref 27–33)
MCHC RBC AUTO-ENTMCNC: 33.4 G/DL (ref 33.6–35)
MCV RBC AUTO: 92.1 FL (ref 81.4–97.8)
MICRO URNS: ABNORMAL
MONOCYTES # BLD AUTO: 0.62 K/UL (ref 0–0.85)
MONOCYTES NFR BLD AUTO: 8.4 % (ref 0–13.4)
NEUTROPHILS # BLD AUTO: 4.4 K/UL (ref 2–7.15)
NEUTROPHILS NFR BLD: 60 % (ref 44–72)
NITRITE UR QL STRIP.AUTO: NEGATIVE
NRBC # BLD AUTO: 0 K/UL
NRBC BLD-RTO: 0 /100 WBC
PH UR STRIP.AUTO: 7 [PH] (ref 5–8)
PLATELET # BLD AUTO: 293 K/UL (ref 164–446)
PMV BLD AUTO: 8.9 FL (ref 9–12.9)
POTASSIUM SERPL-SCNC: 3.9 MMOL/L (ref 3.6–5.5)
PROT SERPL-MCNC: 6.9 G/DL (ref 6–8.2)
PROT UR QL STRIP: 100 MG/DL
RBC # BLD AUTO: 5.33 M/UL (ref 4.2–5.4)
RBC # URNS HPF: ABNORMAL /HPF
RBC UR QL AUTO: ABNORMAL
SODIUM SERPL-SCNC: 138 MMOL/L (ref 135–145)
SP GR UR STRIP.AUTO: 1
T4 FREE SERPL-MCNC: 0.74 NG/DL (ref 0.93–1.7)
TROPONIN T SERPL-MCNC: <6 NG/L (ref 6–19)
TSH SERPL DL<=0.005 MIU/L-ACNC: 0.41 UIU/ML (ref 0.38–5.33)
UROBILINOGEN UR STRIP.AUTO-MCNC: 0.2 MG/DL
WBC # BLD AUTO: 7.3 K/UL (ref 4.8–10.8)
WBC #/AREA URNS HPF: ABNORMAL /HPF

## 2022-04-12 PROCEDURE — 93005 ELECTROCARDIOGRAM TRACING: CPT

## 2022-04-12 PROCEDURE — 81001 URINALYSIS AUTO W/SCOPE: CPT

## 2022-04-12 PROCEDURE — 93005 ELECTROCARDIOGRAM TRACING: CPT | Performed by: EMERGENCY MEDICINE

## 2022-04-12 PROCEDURE — 84439 ASSAY OF FREE THYROXINE: CPT

## 2022-04-12 PROCEDURE — 36415 COLL VENOUS BLD VENIPUNCTURE: CPT

## 2022-04-12 PROCEDURE — 84484 ASSAY OF TROPONIN QUANT: CPT

## 2022-04-12 PROCEDURE — 99284 EMERGENCY DEPT VISIT MOD MDM: CPT

## 2022-04-12 PROCEDURE — 74176 CT ABD & PELVIS W/O CONTRAST: CPT

## 2022-04-12 PROCEDURE — 85025 COMPLETE CBC W/AUTO DIFF WBC: CPT

## 2022-04-12 PROCEDURE — 80053 COMPREHEN METABOLIC PANEL: CPT

## 2022-04-12 PROCEDURE — 84703 CHORIONIC GONADOTROPIN ASSAY: CPT

## 2022-04-12 PROCEDURE — 71045 X-RAY EXAM CHEST 1 VIEW: CPT

## 2022-04-12 PROCEDURE — 84443 ASSAY THYROID STIM HORMONE: CPT

## 2022-04-12 RX ORDER — ONDANSETRON 2 MG/ML
4 INJECTION INTRAMUSCULAR; INTRAVENOUS ONCE
Status: DISCONTINUED | OUTPATIENT
Start: 2022-04-12 | End: 2022-04-13 | Stop reason: HOSPADM

## 2022-04-12 RX ORDER — KETOROLAC TROMETHAMINE 30 MG/ML
30 INJECTION, SOLUTION INTRAMUSCULAR; INTRAVENOUS ONCE
Status: DISCONTINUED | OUTPATIENT
Start: 2022-04-12 | End: 2022-04-13 | Stop reason: HOSPADM

## 2022-04-12 RX ORDER — MORPHINE SULFATE 4 MG/ML
4 INJECTION INTRAVENOUS ONCE
Status: DISCONTINUED | OUTPATIENT
Start: 2022-04-12 | End: 2022-04-12

## 2022-04-12 ASSESSMENT — ENCOUNTER SYMPTOMS
FLANK PAIN: 1
HEADACHES: 1
NAUSEA: 1

## 2022-04-12 ASSESSMENT — FIBROSIS 4 INDEX: FIB4 SCORE: 0.61

## 2022-04-13 VITALS
WEIGHT: 112.21 LBS | TEMPERATURE: 97.8 F | OXYGEN SATURATION: 96 % | DIASTOLIC BLOOD PRESSURE: 119 MMHG | BODY MASS INDEX: 23.55 KG/M2 | RESPIRATION RATE: 16 BRPM | SYSTOLIC BLOOD PRESSURE: 191 MMHG | HEART RATE: 89 BPM | HEIGHT: 58 IN

## 2022-04-13 LAB — EKG IMPRESSION: NORMAL

## 2022-04-13 PROCEDURE — A9270 NON-COVERED ITEM OR SERVICE: HCPCS | Performed by: EMERGENCY MEDICINE

## 2022-04-13 PROCEDURE — 700102 HCHG RX REV CODE 250 W/ 637 OVERRIDE(OP): Performed by: EMERGENCY MEDICINE

## 2022-04-13 RX ORDER — IBUPROFEN 600 MG/1
600 TABLET ORAL ONCE
Status: COMPLETED | OUTPATIENT
Start: 2022-04-13 | End: 2022-04-13

## 2022-04-13 RX ADMIN — IBUPROFEN 600 MG: 600 TABLET, FILM COATED ORAL at 00:30

## 2022-04-13 ASSESSMENT — ENCOUNTER SYMPTOMS
BLURRED VISION: 0
SHORTNESS OF BREATH: 0

## 2022-04-13 ASSESSMENT — PAIN DESCRIPTION - PAIN TYPE: TYPE: ACUTE PAIN

## 2022-04-13 NOTE — ED NOTES
"PT refused IV medications. PT states, \" I do not want IV medications my pain is not high enough for it. I just want a Tylenol or Advil. \" ERP notified of PT request.    "

## 2022-04-13 NOTE — ED NOTES
PT ambulated to room 64 yellow. PT up to bathroom. PT on monitors with call light. PT changed into gown.

## 2022-04-13 NOTE — ED TRIAGE NOTES
Cheryl Sultana  46 y.o.  Chief Complaint   Patient presents with   • Flank Pain     L sided, started tonight   • Headache     Started around lunch time     Ambulatory to lobby for above, pt reports headache starting earlier today and L sided flank pain, no hx kidney stones. BP elevated in lobby, equal bilaterally, pt reports HTN when having headache. NAD. Placed back in lobby.

## 2022-04-13 NOTE — ED PROVIDER NOTES
"ED Provider Note    Scribed for Gloria Alvarez M.D. by Cecilia Del Castillo. 4/12/2022, 10:42 PM.    Primary care provider: LYNDA Nesbitt  Means of arrival: Walk-In  History obtained from: Patient  History limited by: None    CHIEF COMPLAINT  Chief Complaint   Patient presents with   • Flank Pain     L sided, started tonight   • Headache     Started around lunch time       HPI  Cheryl Sultana is a 46 y.o. female who presents to the Emergency Department with complaints of intermittent left sided flank pain onset earlier tonight. She noticed associated nausea, hematuria earlier tonight, and a migraine that started earlier today. She notes that her \"bladder feels heavy\". Her migraine has now subsided. The patient has been told that she has had hematuria in the past but CT cystogram was negative. She reports that she followed up with urology in 2017 and was advised to have more testing but did not follow-up again. She is also on Synthroid for hypothyroidism. She is also on 3.25 mg Carvedilol daily for her hypertension. She does not have a history of migraines when she is feeling unwell and her blood pressure is high. Patient has no history of kidney stones. Reports that her mom had renal disease related to a bacterial infection.    REVIEW OF SYSTEMS  Review of Systems   Eyes: Negative for blurred vision.   Respiratory: Negative for shortness of breath.    Cardiovascular: Negative for chest pain.   Gastrointestinal: Positive for nausea.   Genitourinary: Positive for flank pain and hematuria.   Neurological: Positive for headaches.   All other systems reviewed and are negative.    PAST MEDICAL HISTORY   has a past medical history of Thyroid disease.    SURGICAL HISTORY   has a past surgical history that includes tubal coagulation laparoscopic bilateral.    SOCIAL HISTORY  Social History     Tobacco Use   • Smoking status: Never Smoker   • Smokeless tobacco: Never Used   Vaping Use   • Vaping Use: " "Never used   Substance Use Topics   • Alcohol use: No   • Drug use: Never      Social History     Substance and Sexual Activity   Drug Use Never       FAMILY HISTORY  Family History   Problem Relation Age of Onset   • Thyroid Paternal Aunt         hyperthyroidism   • Thyroid Paternal Aunt         hyperthyroidism   • Thyroid Maternal Grandmother         goiter       CURRENT MEDICATIONS  Home Medications     Reviewed by Riddhi Bermudez R.N. (Registered Nurse) on 04/12/22 at 2211  Med List Status: <None>   Medication Last Dose Status   chlorthalidone (HYGROTON) 25 MG Tab  Active   fluticasone (FLONASE) 50 MCG/ACT nasal spray  Active   ibuprofen (MOTRIN) 200 MG Tab  Active   levothyroxine (SYNTHROID) 25 MCG Tab  Active   liothyronine (CYTOMEL) 5 MCG Tab  Active              ALLERGIES  Allergies   Allergen Reactions   • Methimazole Rash       PHYSICAL EXAM  VITAL SIGNS: BP (!) 221/135 Comment: Left arm  Pulse 95   Temp 36.6 °C (97.8 °F) (Temporal)   Resp 16   Ht 1.473 m (4' 10\")   Wt 50.9 kg (112 lb 3.4 oz)   SpO2 99%   BMI 23.45 kg/m²   Nursing note and vitals reviewed.  Constitutional: Well-developed and well-nourished. No distress.   HENT: Head is normocephalic and atraumatic. Oropharynx is clear and moist without exudate or erythema.   Eyes: Pupils are equal, round, and reactive to light. No horizontal or vertical nystagmus. Conjunctiva are normal.   Cardiovascular: Normal rate and regular rhythm. No murmur heard. Normal radial pulses.  Pulmonary/Chest: Breath sounds normal. No wheezes or rales.   Abdominal: Soft and non-tender. No distention.   no CVA tenderness bilaterally   Musculoskeletal: Extremities exhibit normal range of motion without edema or tenderness. Patient ambulates with a normal narrow-based steady gait.   Neurological: Awake, alert and oriented to person, place, and time. No focal deficits noted. Cranial nerves II - XII intact. No pronator drift.   Normal speech and language. Normal " strength and sensation in bilateral upper and lower extremities.   Skin: Skin is warm and dry. No rash.   Psychiatric: Normal mood and affect. Appropriate for clinical situation.    DIAGNOSTIC STUDIES /  LABS  Labs Reviewed   CBC WITH DIFFERENTIAL - Abnormal; Notable for the following components:       Result Value    Hemoglobin 16.4 (*)     Hematocrit 49.1 (*)     MCHC 33.4 (*)     MPV 8.9 (*)     Immature Granulocytes 1.20 (*)     All other components within normal limits   COMP METABOLIC PANEL - Abnormal; Notable for the following components:    Glucose 111 (*)     All other components within normal limits   URINALYSIS,CULTURE IF INDICATED - Abnormal; Notable for the following components:    Protein 100 (*)     Occult Blood Moderate (*)     All other components within normal limits    Narrative:     Indication for culture:->Patient WITHOUT an indwelling Samson  catheter in place with new onset of Dysuria, Frequency,  Urgency, and/or Suprapubic pain   URINE MICROSCOPIC (W/UA) - Abnormal; Notable for the following components:    RBC 5-10 (*)     All other components within normal limits    Narrative:     Indication for culture:->Patient WITHOUT an indwelling Samson  catheter in place with new onset of Dysuria, Frequency,  Urgency, and/or Suprapubic pain   FREE THYROXINE - Abnormal; Notable for the following components:    Free T-4 0.74 (*)     All other components within normal limits   TROPONIN   TSH   HCG QUAL SERUM   ESTIMATED GFR       All labs reviewed by me.    EKG Interpretation:  Interpreted by myself    12 Lead EKG interpreted by me to show:  EKG at 10:11 PM: normal sinus rhythm, heart rate 86, normal axis, normal intervals, , QRS 84, , no acute ST/T segment changes, no evidence of acute arrhythmia or ischemia  My impression of this EKG: Does not indicate ischemia or arrhythmia at this time.    RADIOLOGY  CT-RENAL COLIC EVALUATION(A/P W/O)   Final Result      1.  No acute intra-abdominal  findings      DX-CHEST-PORTABLE (1 VIEW)   Final Result      No evidence of acute cardiopulmonary process.        The radiologist's interpretation of all radiological studies have been reviewed by me.    REASSESSMENT  10:51 PM- Patient assessed at bedside. Labs, imaging and EKG will be ordered and patient informed she would be treated for pain.     COURSE & MEDICAL DECISION MAKING  Nursing notes, VS, PMSFHx reviewed in chart.    Patient is a 46-year-old female who comes in for evaluation of flank pain, hematuria and headache. Differential diagnosis includes essential hypertension, hypertensive urgency, migraine headache, tension headache, hematuria, renal dysfunction, nephrolithiasis, mass. Diagnostic workup includes labs, EKG, and renal CT.    Patients initial vitals are notable for hypertension with blood pressure in the 220 systolic. EKG returns demonstrates no evidence of acute arrhythmia ischemia. Labs return are unremarkable. Troponin and renal function are within normal limits. There is no end organ dysfunction secondary to hypertension, therefore patient needs ongoing close management of her hypertension. She is given a repeat dose of her home carvedilol and Motrin for her headache in the emergency department after which blood pressure improves to the 190s systolic. Urinalysis returns demonstrates no evidence of infection. There is blood in the urine of unclear etiology, patient has had this before. CT demonstrates no evidence of nephrolithiasis. Patient is advised that she will need the hematuria followed up further by urology and she is amenable to this plan. She is advised to keep the blood pressure diary as well and follow-up with her primary care physician. She is been given strict return precautions and discharged in stable condition.      FINAL IMPRESSION  1. Other migraine without status migrainosus, not intractable    2. Flank pain    3. Hypertension, unspecified type    4. Hematuria, unspecified  type          ICecilia (Gin), am scribing for, and in the presence of, Gloria Alvarez M.D..    Electronically signed by: Cecilia Del Castillo (Gin), 4/12/2022    IGloria M.D. personally performed the services described in this documentation, as scribed by Cecilia Del Castillo in my presence, and it is both accurate and complete.    The note accurately reflects work and decisions made by me.  Gloria Alvarez M.D.  4/13/2022  1:06 AM

## 2022-04-13 NOTE — DISCHARGE INSTRUCTIONS
As we discussed keep a blood pressure diary and if your blood pressure is persistently over 200 over multiple checks take an extra dose of carvedilol. Follow-up with urology for evaluation of the blood in your urine.

## 2022-05-18 ENCOUNTER — APPOINTMENT (OUTPATIENT)
Dept: RADIOLOGY | Facility: MEDICAL CENTER | Age: 47
End: 2022-05-18
Attending: EMERGENCY MEDICINE
Payer: COMMERCIAL

## 2022-05-18 ENCOUNTER — HOSPITAL ENCOUNTER (EMERGENCY)
Facility: MEDICAL CENTER | Age: 47
End: 2022-05-18
Attending: EMERGENCY MEDICINE
Payer: COMMERCIAL

## 2022-05-18 VITALS
HEIGHT: 58 IN | OXYGEN SATURATION: 97 % | BODY MASS INDEX: 22.26 KG/M2 | WEIGHT: 106.04 LBS | RESPIRATION RATE: 16 BRPM | DIASTOLIC BLOOD PRESSURE: 116 MMHG | TEMPERATURE: 96.8 F | SYSTOLIC BLOOD PRESSURE: 168 MMHG | HEART RATE: 75 BPM

## 2022-05-18 DIAGNOSIS — R10.12 LUQ ABDOMINAL PAIN: ICD-10-CM

## 2022-05-18 LAB
ALBUMIN SERPL BCP-MCNC: 4.2 G/DL (ref 3.2–4.9)
ALBUMIN/GLOB SERPL: 1.6 G/DL
ALP SERPL-CCNC: 60 U/L (ref 30–99)
ALT SERPL-CCNC: 23 U/L (ref 2–50)
AMORPH CRY #/AREA URNS HPF: PRESENT /HPF
ANION GAP SERPL CALC-SCNC: 11 MMOL/L (ref 7–16)
APPEARANCE UR: CLEAR
AST SERPL-CCNC: 24 U/L (ref 12–45)
BASOPHILS # BLD AUTO: 0.9 % (ref 0–1.8)
BASOPHILS # BLD: 0.07 K/UL (ref 0–0.12)
BILIRUB SERPL-MCNC: 0.4 MG/DL (ref 0.1–1.5)
BILIRUB UR QL STRIP.AUTO: NEGATIVE
BUN SERPL-MCNC: 14 MG/DL (ref 8–22)
CALCIUM SERPL-MCNC: 9.1 MG/DL (ref 8.5–10.5)
CHLORIDE SERPL-SCNC: 101 MMOL/L (ref 96–112)
CO2 SERPL-SCNC: 24 MMOL/L (ref 20–33)
COLOR UR: YELLOW
CREAT SERPL-MCNC: 0.81 MG/DL (ref 0.5–1.4)
EOSINOPHIL # BLD AUTO: 0.17 K/UL (ref 0–0.51)
EOSINOPHIL NFR BLD: 2.2 % (ref 0–6.9)
ERYTHROCYTE [DISTWIDTH] IN BLOOD BY AUTOMATED COUNT: 42.1 FL (ref 35.9–50)
GFR SERPLBLD CREATININE-BSD FMLA CKD-EPI: 89 ML/MIN/1.73 M 2
GLOBULIN SER CALC-MCNC: 2.6 G/DL (ref 1.9–3.5)
GLUCOSE SERPL-MCNC: 87 MG/DL (ref 65–99)
GLUCOSE UR STRIP.AUTO-MCNC: NEGATIVE MG/DL
HCG SERPL QL: NEGATIVE
HCT VFR BLD AUTO: 47.9 % (ref 37–47)
HGB BLD-MCNC: 16.1 G/DL (ref 12–16)
IMM GRANULOCYTES # BLD AUTO: 0.07 K/UL (ref 0–0.11)
IMM GRANULOCYTES NFR BLD AUTO: 0.9 % (ref 0–0.9)
KETONES UR STRIP.AUTO-MCNC: NEGATIVE MG/DL
LEUKOCYTE ESTERASE UR QL STRIP.AUTO: NEGATIVE
LIPASE SERPL-CCNC: 100 U/L (ref 11–82)
LYMPHOCYTES # BLD AUTO: 1.4 K/UL (ref 1–4.8)
LYMPHOCYTES NFR BLD: 18.4 % (ref 22–41)
MCH RBC QN AUTO: 31.1 PG (ref 27–33)
MCHC RBC AUTO-ENTMCNC: 33.6 G/DL (ref 33.6–35)
MCV RBC AUTO: 92.6 FL (ref 81.4–97.8)
MICRO URNS: ABNORMAL
MONOCYTES # BLD AUTO: 0.62 K/UL (ref 0–0.85)
MONOCYTES NFR BLD AUTO: 8.2 % (ref 0–13.4)
NEUTROPHILS # BLD AUTO: 5.27 K/UL (ref 2–7.15)
NEUTROPHILS NFR BLD: 69.4 % (ref 44–72)
NITRITE UR QL STRIP.AUTO: NEGATIVE
NRBC # BLD AUTO: 0 K/UL
NRBC BLD-RTO: 0 /100 WBC
PH UR STRIP.AUTO: 6.5 [PH] (ref 5–8)
PLATELET # BLD AUTO: 255 K/UL (ref 164–446)
PMV BLD AUTO: 8.9 FL (ref 9–12.9)
POTASSIUM SERPL-SCNC: 4.2 MMOL/L (ref 3.6–5.5)
PROT SERPL-MCNC: 6.8 G/DL (ref 6–8.2)
PROT UR QL STRIP: 30 MG/DL
RBC # BLD AUTO: 5.17 M/UL (ref 4.2–5.4)
RBC # URNS HPF: NORMAL /HPF
RBC UR QL AUTO: ABNORMAL
SODIUM SERPL-SCNC: 136 MMOL/L (ref 135–145)
SP GR UR STRIP.AUTO: 1
UROBILINOGEN UR STRIP.AUTO-MCNC: 0.2 MG/DL
WBC # BLD AUTO: 7.6 K/UL (ref 4.8–10.8)
WBC #/AREA URNS HPF: NORMAL /HPF

## 2022-05-18 PROCEDURE — A9270 NON-COVERED ITEM OR SERVICE: HCPCS | Performed by: EMERGENCY MEDICINE

## 2022-05-18 PROCEDURE — 700102 HCHG RX REV CODE 250 W/ 637 OVERRIDE(OP): Performed by: EMERGENCY MEDICINE

## 2022-05-18 PROCEDURE — 83690 ASSAY OF LIPASE: CPT

## 2022-05-18 PROCEDURE — 81001 URINALYSIS AUTO W/SCOPE: CPT

## 2022-05-18 PROCEDURE — 36415 COLL VENOUS BLD VENIPUNCTURE: CPT

## 2022-05-18 PROCEDURE — 76705 ECHO EXAM OF ABDOMEN: CPT

## 2022-05-18 PROCEDURE — 84703 CHORIONIC GONADOTROPIN ASSAY: CPT

## 2022-05-18 PROCEDURE — 85025 COMPLETE CBC W/AUTO DIFF WBC: CPT

## 2022-05-18 PROCEDURE — 99284 EMERGENCY DEPT VISIT MOD MDM: CPT

## 2022-05-18 PROCEDURE — 80053 COMPREHEN METABOLIC PANEL: CPT

## 2022-05-18 RX ORDER — SUCRALFATE 1 G/1
1 TABLET ORAL
Qty: 120 TABLET | Refills: 3 | Status: SHIPPED | OUTPATIENT
Start: 2022-05-18 | End: 2022-11-01

## 2022-05-18 RX ORDER — OMEPRAZOLE 20 MG/1
20 CAPSULE, DELAYED RELEASE ORAL DAILY
Status: SHIPPED | COMMUNITY
End: 2022-11-01

## 2022-05-18 RX ORDER — CARVEDILOL 3.12 MG/1
3.12 TABLET ORAL 2 TIMES DAILY PRN
COMMUNITY

## 2022-05-18 RX ADMIN — LIDOCAINE HYDROCHLORIDE 30 ML: 20 SOLUTION OROPHARYNGEAL at 12:55

## 2022-05-18 ASSESSMENT — FIBROSIS 4 INDEX: FIB4 SCORE: 0.78

## 2022-05-18 ASSESSMENT — PAIN DESCRIPTION - DESCRIPTORS: DESCRIPTORS: SHARP

## 2022-05-18 NOTE — ED NOTES
All lines and monitors disconnected.  Discharge instructions were reviewed, questions answered.  Pt provided with prescriptions X 1.  Pt states all belongings in possession.  Pt ambulates to the lobby, escorted by RN.

## 2022-05-18 NOTE — ED TRIAGE NOTES
To triage with   Chief Complaint   Patient presents with   • LUQ Pain     9/10 LUQ pain and elevated Lipase x several months. Scheduled for endoscopy/colonoscopy in July. Followed by GI sepecialist. Denies fever. Protocol ordered.

## 2022-05-18 NOTE — ED PROVIDER NOTES
ED Provider Note    CHIEF COMPLAINT  Chief Complaint   Patient presents with   • LUQ Pain       HPI  Cheryl Sultana is a 46 y.o. female who returns with ongoing left sided abdominal pain, today became more sharp than usual so she came here to the emergency department.  She is had some nausea but no vomiting.  She describes normal bowel movements.  The patient has been experiencing the symptoms for over a month now, she was found to have a minimally elevated lipase.  She followed up with her gastroenterologist who is scheduled an endoscopy for July.  Patient is concerned about the amount of time that has to go by until the endoscopy.  No right-sided pain.  She has blood in her urine but was evaluated by urology with a cystoscopy and told everything looked fine.  No chest pain.  No shortness of breath.  She has no other specific complaints.    REVIEW OF SYSTEMS  Negative for fever, rash, chest pain, dyspnea, vomiting, diarrhea, headache. All other systems are negative.     PAST MEDICAL HISTORY  Past Medical History:   Diagnosis Date   • Thyroid disease        FAMILY HISTORY  Family History   Problem Relation Age of Onset   • Thyroid Paternal Aunt         hyperthyroidism   • Thyroid Paternal Aunt         hyperthyroidism   • Thyroid Maternal Grandmother         goiter       SOCIAL HISTORY  Social History     Tobacco Use   • Smoking status: Never Smoker   • Smokeless tobacco: Never Used   Vaping Use   • Vaping Use: Never used   Substance Use Topics   • Alcohol use: No   • Drug use: Never       SURGICAL HISTORY  Past Surgical History:   Procedure Laterality Date   • TUBAL COAGULATION LAPAROSCOPIC BILATERAL         CURRENT MEDICATIONS  I personally reviewed the medication list in the charting documentation.     ALLERGIES  Allergies   Allergen Reactions   • Methimazole Rash       MEDICAL RECORD  I have reviewed patient's medical record and pertinent results are listed above.      PHYSICAL EXAM  VITAL SIGNS: BP  "(!) 190/110   Pulse 90   Temp 37 °C (98.6 °F) (Temporal)   Resp 16   Ht 1.473 m (4' 10\")   Wt 48.1 kg (106 lb 0.7 oz)   LMP  (LMP Unknown)   SpO2 99%   BMI 22.16 kg/m²    Constitutional: Well appearing patient in no acute distress.  Awake and alert, not toxic nor ill in appearance.  HENT: Normocephalic, no obvious evidence of acute trauma.  Eyes: No scleral icterus. Normal conjunctiva   Neck: Comfortable movement without any obvious restriction in the range of motion.  Cardiovascular: Upon ascultation I appreciate a regular heart rhythm and a normal rate.   Thorax & Lungs: Normal nonlabored respirations.  Upon application of the stethoscope for auscultation I find there to be no associated chest wall tenderness.  I appreciate no wheezing, rhonchi or rales. There is normal air movement.    Abdomen: The abdomen is not visibly distended.  Upon palpation has focal tenderness in the epigastrium and left upper quadrant, no rebound, no guarding, no right upper quadrant tenderness.  Skin: The exposed portions of skin reveal no obvious rash or other abnormalities.  Extremities/Musculoskeletal: No obvious sign of acute trauma. No asymmetric calf tenderness or edema.   Neurologic: Alert & oriented. No focal deficits observed.   Psychiatric: Normal affect appropriate for the clinical situation.    DIAGNOSTIC STUDIES / PROCEDURES    LABS/EKGs  Results for orders placed or performed during the hospital encounter of 05/18/22   CBC WITH DIFFERENTIAL   Result Value Ref Range    WBC 7.6 4.8 - 10.8 K/uL    RBC 5.17 4.20 - 5.40 M/uL    Hemoglobin 16.1 (H) 12.0 - 16.0 g/dL    Hematocrit 47.9 (H) 37.0 - 47.0 %    MCV 92.6 81.4 - 97.8 fL    MCH 31.1 27.0 - 33.0 pg    MCHC 33.6 33.6 - 35.0 g/dL    RDW 42.1 35.9 - 50.0 fL    Platelet Count 255 164 - 446 K/uL    MPV 8.9 (L) 9.0 - 12.9 fL    Neutrophils-Polys 69.40 44.00 - 72.00 %    Lymphocytes 18.40 (L) 22.00 - 41.00 %    Monocytes 8.20 0.00 - 13.40 %    Eosinophils 2.20 0.00 - 6.90 " %    Basophils 0.90 0.00 - 1.80 %    Immature Granulocytes 0.90 0.00 - 0.90 %    Nucleated RBC 0.00 /100 WBC    Neutrophils (Absolute) 5.27 2.00 - 7.15 K/uL    Lymphs (Absolute) 1.40 1.00 - 4.80 K/uL    Monos (Absolute) 0.62 0.00 - 0.85 K/uL    Eos (Absolute) 0.17 0.00 - 0.51 K/uL    Baso (Absolute) 0.07 0.00 - 0.12 K/uL    Immature Granulocytes (abs) 0.07 0.00 - 0.11 K/uL    NRBC (Absolute) 0.00 K/uL   COMP METABOLIC PANEL   Result Value Ref Range    Sodium 136 135 - 145 mmol/L    Potassium 4.2 3.6 - 5.5 mmol/L    Chloride 101 96 - 112 mmol/L    Co2 24 20 - 33 mmol/L    Anion Gap 11.0 7.0 - 16.0    Glucose 87 65 - 99 mg/dL    Bun 14 8 - 22 mg/dL    Creatinine 0.81 0.50 - 1.40 mg/dL    Calcium 9.1 8.5 - 10.5 mg/dL    AST(SGOT) 24 12 - 45 U/L    ALT(SGPT) 23 2 - 50 U/L    Alkaline Phosphatase 60 30 - 99 U/L    Total Bilirubin 0.4 0.1 - 1.5 mg/dL    Albumin 4.2 3.2 - 4.9 g/dL    Total Protein 6.8 6.0 - 8.2 g/dL    Globulin 2.6 1.9 - 3.5 g/dL    A-G Ratio 1.6 g/dL   LIPASE   Result Value Ref Range    Lipase 100 (H) 11 - 82 U/L   HCG QUAL SERUM   Result Value Ref Range    Beta-Hcg Qualitative Serum Negative Negative   URINALYSIS,CULTURE IF INDICATED    Specimen: Urine   Result Value Ref Range    Color Yellow     Character Clear     Specific Gravity 1.003 <1.035    Ph 6.5 5.0 - 8.0    Glucose Negative Negative mg/dL    Ketones Negative Negative mg/dL    Protein 30 (A) Negative mg/dL    Bilirubin Negative Negative    Urobilinogen, Urine 0.2 Negative    Nitrite Negative Negative    Leukocyte Esterase Negative Negative    Occult Blood Small (A) Negative    Micro Urine Req Microscopic    URINE MICROSCOPIC (W/UA)   Result Value Ref Range    WBC 0-2 /hpf    RBC 0-2 /hpf    Amorphous Crystal Present /hpf   ESTIMATED GFR   Result Value Ref Range    GFR (CKD-EPI) 89 >60 mL/min/1.73 m 2        RADIOLOGY  US-RUQ   Final Result      1. No acute findings.   2. Stable right lobe and left lobe hepatic cysts.   3. Gallbladder polyp  measuring 4 mm in diameter.            COURSE & MEDICAL DECISION MAKING  I have reviewed any medical record information, laboratory studies and radiographic results as noted above.  Differential diagnoses includes: Gastritis/PUD, pancreatitis, hepatitis, biliary obstruction, dehydration, lites abnormalities, anemia    Encounter Summary: This is a very pleasant 46 y.o. female who unfortunately required evaluation in the emergency department today with ongoing left upper quadrant abdominal pain, has an endoscopy scheduled with GI for July, today's pain worsened compared to usual so she comes here.  She states that she also has had a persistently elevated lipase.  Patient presents hypertensive and states that she is always hypertensive at the hospital, history of Graves' disease on carvedilol, states that when she goes to her PCP her blood pressure is fine.  She has tenderness in the abdomen no indication of peritonitis.  Her blood work again demonstrates a minimally elevated lipase at 100, everything else is within normal limits.  She has not had an ultrasound of the right upper quadrant, given the persistent elevation of the lipase I think that may be helpful so that will be performed here in the emergency department.  She will be treated with a GI cocktail. -------- the right upper quadrant ultrasound reveals no obvious acute abnormalities.  At this point, I think the patient appears well and is appropriate for discharge.  She will continue her follow-up with GI for endoscopy.  In the meantime I will add Carafate to her medication regimen.  Return instructions provided, discharged home in stable condition      DISPOSITION: Discharged home in stable condition      FINAL IMPRESSION  1. LUQ abdominal pain           This dictation was created using voice recognition software. The accuracy of the dictation is limited to the abilities of the software. I expect there may be some errors of grammar and possibly content. The  nursing notes were reviewed and certain aspects of this information were incorporated into this note.    Electronically signed by: Sung Rosas M.D., 5/18/2022 12:40 PM

## 2022-06-03 ENCOUNTER — HOSPITAL ENCOUNTER (OUTPATIENT)
Facility: MEDICAL CENTER | Age: 47
End: 2022-06-03
Attending: PHYSICIAN ASSISTANT
Payer: COMMERCIAL

## 2022-06-03 ENCOUNTER — OFFICE VISIT (OUTPATIENT)
Dept: URGENT CARE | Facility: CLINIC | Age: 47
End: 2022-06-03
Payer: COMMERCIAL

## 2022-06-03 VITALS
WEIGHT: 105.8 LBS | RESPIRATION RATE: 18 BRPM | TEMPERATURE: 98.4 F | HEART RATE: 112 BPM | DIASTOLIC BLOOD PRESSURE: 82 MMHG | SYSTOLIC BLOOD PRESSURE: 134 MMHG | HEIGHT: 58 IN | OXYGEN SATURATION: 95 % | BODY MASS INDEX: 22.21 KG/M2

## 2022-06-03 DIAGNOSIS — J00 ACUTE RHINITIS: ICD-10-CM

## 2022-06-03 DIAGNOSIS — H66.001 NON-RECURRENT ACUTE SUPPURATIVE OTITIS MEDIA OF RIGHT EAR WITHOUT SPONTANEOUS RUPTURE OF TYMPANIC MEMBRANE: ICD-10-CM

## 2022-06-03 PROCEDURE — 99213 OFFICE O/P EST LOW 20 MIN: CPT | Performed by: PHYSICIAN ASSISTANT

## 2022-06-03 PROCEDURE — U0003 INFECTIOUS AGENT DETECTION BY NUCLEIC ACID (DNA OR RNA); SEVERE ACUTE RESPIRATORY SYNDROME CORONAVIRUS 2 (SARS-COV-2) (CORONAVIRUS DISEASE [COVID-19]), AMPLIFIED PROBE TECHNIQUE, MAKING USE OF HIGH THROUGHPUT TECHNOLOGIES AS DESCRIBED BY CMS-2020-01-R: HCPCS

## 2022-06-03 PROCEDURE — U0005 INFEC AGEN DETEC AMPLI PROBE: HCPCS

## 2022-06-03 RX ORDER — AMOXICILLIN AND CLAVULANATE POTASSIUM 875; 125 MG/1; MG/1
1 TABLET, FILM COATED ORAL 2 TIMES DAILY
Qty: 14 TABLET | Refills: 0 | Status: SHIPPED | OUTPATIENT
Start: 2022-06-03 | End: 2022-06-10

## 2022-06-03 ASSESSMENT — ENCOUNTER SYMPTOMS
NAUSEA: 0
EYE PAIN: 0
SORE THROAT: 1
VOMITING: 0
ABDOMINAL PAIN: 0
CONSTIPATION: 0
MYALGIAS: 0
CHILLS: 0
FEVER: 0
SHORTNESS OF BREATH: 0
DIARRHEA: 0
COUGH: 0
HEADACHES: 0

## 2022-06-03 ASSESSMENT — FIBROSIS 4 INDEX: FIB4 SCORE: 0.94

## 2022-06-03 NOTE — PROGRESS NOTES
"Subjective:   Cheryl Sultana is a 46 y.o. female who presents for Otalgia (Pt has congestion, (R) ear pain, cough x 4 days )      Pleasant 38-year-old female presents with 3 to 4-day history of congestion, postnasal drip, mild sore throat as well as right-sided otalgia.  She is most bothered by the right-sided ear discomfort.  Has tried Flonase today with minimal improvement.  Has had a mild cough but fairly minimal.  Overall notes only mild malaise.      Review of Systems   Constitutional: Positive for malaise/fatigue. Negative for chills and fever.   HENT: Positive for congestion, ear pain and sore throat.    Eyes: Negative for pain.   Respiratory: Negative for cough and shortness of breath.    Cardiovascular: Negative for chest pain.   Gastrointestinal: Negative for abdominal pain, constipation, diarrhea, nausea and vomiting.   Genitourinary: Negative for dysuria.   Musculoskeletal: Negative for myalgias.   Skin: Negative for rash.   Neurological: Negative for headaches.       Medications, Allergies, and current problem list reviewed today in Epic.     Objective:     /82 (BP Location: Left arm, Patient Position: Sitting, BP Cuff Size: Adult)   Pulse (!) 112   Temp 36.9 °C (98.4 °F) (Temporal)   Resp 18   Ht 1.473 m (4' 10\")   Wt 48 kg (105 lb 12.8 oz)   SpO2 95%     Physical Exam  Vitals reviewed.   Constitutional:       Appearance: Normal appearance.   HENT:      Head: Normocephalic and atraumatic.      Right Ear: External ear normal.      Left Ear: Tympanic membrane, ear canal and external ear normal.      Ears:      Comments: Erythematous and bulging right tympanic membrane     Nose: Congestion and rhinorrhea present.      Mouth/Throat:      Mouth: Mucous membranes are moist.      Pharynx: No oropharyngeal exudate or posterior oropharyngeal erythema.      Comments: POST NASAL DRIP  Eyes:      Conjunctiva/sclera: Conjunctivae normal.   Cardiovascular:      Rate and Rhythm: Normal rate and " regular rhythm.   Pulmonary:      Effort: Pulmonary effort is normal.      Breath sounds: Normal breath sounds.   Musculoskeletal:      Cervical back: Normal range of motion.   Lymphadenopathy:      Cervical: No cervical adenopathy.   Skin:     General: Skin is warm and dry.      Capillary Refill: Capillary refill takes less than 2 seconds.   Neurological:      Mental Status: She is alert and oriented to person, place, and time.         Assessment/Plan:     Diagnosis and associated orders:     1. Non-recurrent acute suppurative otitis media of right ear without spontaneous rupture of tympanic membrane  amoxicillin-clavulanate (AUGMENTIN) 875-125 MG Tab   2. Acute rhinitis  COVID/SARS CoV-2 PCR      Comments/MDM:     • Patient with nonspecific URI symptoms, will do COVID testing although she has an obvious right-sided otitis media that I recommend antibiotics for at this point.  Recommend antihistamine, steroid nasal spray         Differential diagnosis, natural history, supportive care, and indications for immediate follow-up discussed.    Advised the patient to follow-up with the primary care physician for recheck, reevaluation, and consideration of further management.    Please note that this dictation was created using voice recognition software. I have made a reasonable attempt to correct obvious errors, but I expect that there are errors of grammar and possibly content that I did not discover before finalizing the note.    This note was electronically signed by Reed Michael PA-C

## 2022-06-04 LAB
COVID ORDER STATUS COVID19: NORMAL
SARS-COV-2 RNA RESP QL NAA+PROBE: NOTDETECTED
SPECIMEN SOURCE: NORMAL

## 2022-07-21 ENCOUNTER — HOSPITAL ENCOUNTER (OUTPATIENT)
Dept: RADIOLOGY | Facility: MEDICAL CENTER | Age: 47
End: 2022-07-21
Attending: NURSE PRACTITIONER
Payer: COMMERCIAL

## 2022-07-21 DIAGNOSIS — N83.201 CYST OF RIGHT OVARY: ICD-10-CM

## 2022-11-01 ENCOUNTER — APPOINTMENT (OUTPATIENT)
Dept: RADIOLOGY | Facility: MEDICAL CENTER | Age: 47
End: 2022-11-01
Attending: EMERGENCY MEDICINE
Payer: COMMERCIAL

## 2022-11-01 ENCOUNTER — HOSPITAL ENCOUNTER (EMERGENCY)
Facility: MEDICAL CENTER | Age: 47
End: 2022-11-01
Attending: EMERGENCY MEDICINE
Payer: COMMERCIAL

## 2022-11-01 VITALS
SYSTOLIC BLOOD PRESSURE: 156 MMHG | OXYGEN SATURATION: 94 % | RESPIRATION RATE: 16 BRPM | HEART RATE: 84 BPM | WEIGHT: 110.23 LBS | TEMPERATURE: 97.7 F | DIASTOLIC BLOOD PRESSURE: 74 MMHG | HEIGHT: 58 IN | BODY MASS INDEX: 23.14 KG/M2

## 2022-11-01 DIAGNOSIS — F43.9 STRESS: ICD-10-CM

## 2022-11-01 DIAGNOSIS — R07.89 ATYPICAL CHEST PAIN: ICD-10-CM

## 2022-11-01 LAB
ALBUMIN SERPL BCP-MCNC: 4 G/DL (ref 3.2–4.9)
ALBUMIN/GLOB SERPL: 1.4 G/DL
ALP SERPL-CCNC: 62 U/L (ref 30–99)
ALT SERPL-CCNC: 20 U/L (ref 2–50)
ANION GAP SERPL CALC-SCNC: 11 MMOL/L (ref 7–16)
AST SERPL-CCNC: 24 U/L (ref 12–45)
BASOPHILS # BLD AUTO: 1 % (ref 0–1.8)
BASOPHILS # BLD: 0.09 K/UL (ref 0–0.12)
BILIRUB SERPL-MCNC: 0.3 MG/DL (ref 0.1–1.5)
BUN SERPL-MCNC: 13 MG/DL (ref 8–22)
CALCIUM SERPL-MCNC: 9.4 MG/DL (ref 8.5–10.5)
CHLORIDE SERPL-SCNC: 105 MMOL/L (ref 96–112)
CO2 SERPL-SCNC: 24 MMOL/L (ref 20–33)
CREAT SERPL-MCNC: 0.82 MG/DL (ref 0.5–1.4)
EKG IMPRESSION: NORMAL
EOSINOPHIL # BLD AUTO: 0.24 K/UL (ref 0–0.51)
EOSINOPHIL NFR BLD: 2.6 % (ref 0–6.9)
ERYTHROCYTE [DISTWIDTH] IN BLOOD BY AUTOMATED COUNT: 41.2 FL (ref 35.9–50)
GFR SERPLBLD CREATININE-BSD FMLA CKD-EPI: 87 ML/MIN/1.73 M 2
GLOBULIN SER CALC-MCNC: 2.8 G/DL (ref 1.9–3.5)
GLUCOSE SERPL-MCNC: 138 MG/DL (ref 65–99)
HCT VFR BLD AUTO: 46.5 % (ref 37–47)
HGB BLD-MCNC: 15.2 G/DL (ref 12–16)
IMM GRANULOCYTES # BLD AUTO: 0.07 K/UL (ref 0–0.11)
IMM GRANULOCYTES NFR BLD AUTO: 0.8 % (ref 0–0.9)
LYMPHOCYTES # BLD AUTO: 1.89 K/UL (ref 1–4.8)
LYMPHOCYTES NFR BLD: 20.7 % (ref 22–41)
MCH RBC QN AUTO: 30.1 PG (ref 27–33)
MCHC RBC AUTO-ENTMCNC: 32.7 G/DL (ref 33.6–35)
MCV RBC AUTO: 92.1 FL (ref 81.4–97.8)
MONOCYTES # BLD AUTO: 0.66 K/UL (ref 0–0.85)
MONOCYTES NFR BLD AUTO: 7.2 % (ref 0–13.4)
NEUTROPHILS # BLD AUTO: 6.2 K/UL (ref 2–7.15)
NEUTROPHILS NFR BLD: 67.7 % (ref 44–72)
NRBC # BLD AUTO: 0 K/UL
NRBC BLD-RTO: 0 /100 WBC
PLATELET # BLD AUTO: 280 K/UL (ref 164–446)
PMV BLD AUTO: 9 FL (ref 9–12.9)
POTASSIUM SERPL-SCNC: 4.4 MMOL/L (ref 3.6–5.5)
PROT SERPL-MCNC: 6.8 G/DL (ref 6–8.2)
RBC # BLD AUTO: 5.05 M/UL (ref 4.2–5.4)
SODIUM SERPL-SCNC: 140 MMOL/L (ref 135–145)
TROPONIN T SERPL-MCNC: <6 NG/L (ref 6–19)
TROPONIN T SERPL-MCNC: <6 NG/L (ref 6–19)
WBC # BLD AUTO: 9.2 K/UL (ref 4.8–10.8)

## 2022-11-01 PROCEDURE — 71045 X-RAY EXAM CHEST 1 VIEW: CPT

## 2022-11-01 PROCEDURE — 96374 THER/PROPH/DIAG INJ IV PUSH: CPT

## 2022-11-01 PROCEDURE — 36415 COLL VENOUS BLD VENIPUNCTURE: CPT

## 2022-11-01 PROCEDURE — 85025 COMPLETE CBC W/AUTO DIFF WBC: CPT

## 2022-11-01 PROCEDURE — 93005 ELECTROCARDIOGRAM TRACING: CPT | Performed by: EMERGENCY MEDICINE

## 2022-11-01 PROCEDURE — 99285 EMERGENCY DEPT VISIT HI MDM: CPT

## 2022-11-01 PROCEDURE — 84484 ASSAY OF TROPONIN QUANT: CPT | Mod: 91

## 2022-11-01 PROCEDURE — 93005 ELECTROCARDIOGRAM TRACING: CPT

## 2022-11-01 PROCEDURE — 700111 HCHG RX REV CODE 636 W/ 250 OVERRIDE (IP): Performed by: EMERGENCY MEDICINE

## 2022-11-01 PROCEDURE — 80053 COMPREHEN METABOLIC PANEL: CPT

## 2022-11-01 RX ORDER — LORAZEPAM 2 MG/ML
0.5 INJECTION INTRAMUSCULAR ONCE
Status: COMPLETED | OUTPATIENT
Start: 2022-11-01 | End: 2022-11-01

## 2022-11-01 RX ORDER — VITAMIN B COMPLEX
1000 TABLET ORAL DAILY
COMMUNITY

## 2022-11-01 RX ADMIN — LORAZEPAM 0.5 MG: 2 INJECTION INTRAMUSCULAR; INTRAVENOUS at 12:55

## 2022-11-01 ASSESSMENT — LIFESTYLE VARIABLES
TOTAL SCORE: 0
HAVE YOU EVER FELT YOU SHOULD CUT DOWN ON YOUR DRINKING: NO
AVERAGE NUMBER OF DAYS PER WEEK YOU HAVE A DRINK CONTAINING ALCOHOL: 0
CONSUMPTION TOTAL: NEGATIVE
TOTAL SCORE: 0
DO YOU DRINK ALCOHOL: NO
EVER HAD A DRINK FIRST THING IN THE MORNING TO STEADY YOUR NERVES TO GET RID OF A HANGOVER: NO
ON A TYPICAL DAY WHEN YOU DRINK ALCOHOL HOW MANY DRINKS DO YOU HAVE: 0
TOTAL SCORE: 0
HAVE PEOPLE ANNOYED YOU BY CRITICIZING YOUR DRINKING: NO
HOW MANY TIMES IN THE PAST YEAR HAVE YOU HAD 5 OR MORE DRINKS IN A DAY: 0
EVER FELT BAD OR GUILTY ABOUT YOUR DRINKING: NO

## 2022-11-01 ASSESSMENT — FIBROSIS 4 INDEX: FIB4 SCORE: 0.96

## 2022-11-01 NOTE — Clinical Note
Cheryl Sultana was seen and treated in our emergency department on 11/1/2022.  She may return to work on 11/03/2022.       If you have any questions or concerns, please don't hesitate to call.      Helio Silver D.O.

## 2022-11-01 NOTE — ED NOTES
Pt ambulatory to red pod, agree with triage RN note, VSS on RA, GCS 15, NAD, pt c/o continued chest 'pressure', states only cardiac hx was 'elevated heart enzymes when I had covid'. Up for ERP to see

## 2022-11-01 NOTE — ED PROVIDER NOTES
"ED Provider Note    CHIEF COMPLAINT  Chief Complaint   Patient presents with    Chest Pressure     Pt reports x2 days intermittently with associated L arm numbness/tingling.        HPI  Cheryl Sultana is a 47 y.o. female here for evaluation of left sided chest pain and intermittent numbness to the left arm.  Patient states is been ongoing for 2 days, and states that she has \"a lot of stress\" and is not sleeping well.  She states that adding to this she drank a espresso drink with extra shots and today, and states that this made her very nervous.  She has no back pain, no abdominal pain, no headache, and only numbness of the left shoulder and upper arm, there is no leg or face numbness.  She denies a history of the same.  Otherwise she states she is \"healthy.\"  She was seen by rems earlier today because of the chest pressure, and was given aspirin.  However her friend to drive her here.    ROS;  Please see HPI  O/W negative     PAST MEDICAL HISTORY   has a past medical history of Hypertension and Thyroid disease.    SOCIAL HISTORY  Social History     Tobacco Use    Smoking status: Never    Smokeless tobacco: Never   Vaping Use    Vaping Use: Never used   Substance and Sexual Activity    Alcohol use: No    Drug use: Never    Sexual activity: Not on file       SURGICAL HISTORY   has a past surgical history that includes tubal coagulation laparoscopic bilateral.    CURRENT MEDICATIONS  Home Medications       Reviewed by Freya Clifford R.N. (Registered Nurse) on 11/01/22 at 1214  Med List Status: Partial     Medication Last Dose Status   carvedilol (COREG) 3.125 MG Tab  Active   chlorthalidone (HYGROTON) 25 MG Tab  Active   fluticasone (FLONASE) 50 MCG/ACT nasal spray  Active   ibuprofen (MOTRIN) 200 MG Tab  Active   levothyroxine (SYNTHROID) 25 MCG Tab  Active   liothyronine (CYTOMEL) 5 MCG Tab  Active   omeprazole (PRILOSEC) 20 MG delayed-release capsule  Active   sucralfate (CARAFATE) 1 GM Tab  Active " "                   ALLERGIES  Allergies   Allergen Reactions    Methimazole Rash       REVIEW OF SYSTEMS  See HPI for further details. Review of systems as above, otherwise all other systems are negative.     PHYSICAL EXAM  VITAL SIGNS: BP (!) 182/106   Pulse 81   Temp 36.6 °C (97.8 °F) (Temporal)   Resp 16   Ht 1.473 m (4' 10\")   Wt 50 kg (110 lb 3.7 oz)   SpO2 97%   BMI 23.04 kg/m²     Constitutional: Well developed, well nourished. No acute distress.  HEENT: Normocephalic, atraumatic. MMM  Neck: Supple, Full range of motion   Chest/Pulmonary:  No respiratory distress.  Equal expansion   Musculoskeletal: No deformity, no edema, neurovascular intact.   Neuro: Clear speech, appropriate, cooperative, cranial nerves II-XII grossly intact.  Psych: Anxious mood and affect      PROCEDURES     MEDICAL RECORD  I have reviewed patient's medical record and pertinent results are listed.    COURSE & MEDICAL DECISION MAKING  I have reviewed any medical record information, laboratory studies and radiographic results as noted above.    3:04 PM  The pt has two sets of negative trops, a heart score of 1, unremarkable ekg, and negative cxr.  She is PERC negative. She will follow up, take a couple days off, and try and reduce her stress.  In addition, she has no focal neurodeficits, no weakness, no trouble walking or speaking.    I you have had any blood pressure issues while here in the emergency department, please see your doctor for a further evaluation or work up.    Differential diagnoses include but not limited to: MI, pneumonia, PE, anxiety, TIA, CVA    This patient presents with atypical chest pain, stress .  At this time, I have counseled the patient/family regarding their medications, pain control, and follow up.  They will continue their medications, if any, as prescribed.  They will return immediately for any worsening symptoms and/or any other medical concerns.  They will see their doctor, or contact the doctor " provided, in 1-2 days for follow up.       FINAL IMPRESSION  1. Atypical chest pain        2. Stress                Electronically signed by: Helio Silver D.O., 11/1/2022 12:37 PM

## 2022-11-01 NOTE — PROGRESS NOTES
Spiritual Care Note    Patient Information     Patient's Name: Cheryl Sultana   MRN: [id number]    YOB: 1975   Age and Gender: 47 y.o. female   Service Area: ED [facility]   Room (and Bed):  11/11 RED   Ethnicity or Nationality:     Primary Language: English   Hindu/Spiritual preference: Nondenominational   Place of Residence: [place]   Family/Friends/Others Present: No   Clinical Team Present: No   Medical Diagnosis(-es)/Procedure(s):    Code Status: Prior    Date of Admission: 11/1/2022   Length of Stay: 0 days        Spiritual Care Provider Information:  Name of Spiritual Care Provider: Kimmy Orta  Title of Spiritual Care Provider: Volunteer   Phone Number: [phone]  E-mail: [email or web address]   Total time : 10 minutes    Spiritual Screen Results:    Gen Nursing        Palliative Care         Encounter/Request Information  Encounter/Request Type   Visited With: Patient  Nature of the Visit: Initial, On shift  General Visit: Yes  Referral From/ Origin of Request: [place] rounds, Verbal patient    Religous Needs/Values  Hindu Needs Visit  Hindu Needs: Prayer    Spiritual Assessment   Spiritual Care Encounters  Observations/Symptoms: Accepting, Thankfulness  Interacton/Conversation: Pt requested prayer and thanked the .  Assessment: Need  Need: Seeking Spiritual Assistance and Support, Cultural Issues  Interventions: Compassionate presence, prayer.  Outcomes: Spiritual Comfort  Plan: Visit Upon Request    Notes:

## 2022-11-01 NOTE — ED NOTES
Med Rec completed per patient   Allergies reviewed  No ORAL antibiotics in last 30 days    Patient took a one time dose of Asprin 325 mg today (11/1/2022) around 1100

## 2022-11-01 NOTE — ED TRIAGE NOTES
"Chief Complaint   Patient presents with    Chest Pressure     Pt reports x2 days intermittently with associated L arm numbness/tingling.      BP (!) 182/106   Pulse 81   Temp 36.6 °C (97.8 °F) (Temporal)   Resp 16   Ht 1.473 m (4' 10\")   Wt 50 kg (110 lb 3.7 oz)   SpO2 97%   BMI 23.04 kg/m²     Pt ambulatory to triage for above, protocol ordered.   "

## 2023-01-28 ENCOUNTER — OFFICE VISIT (OUTPATIENT)
Dept: URGENT CARE | Facility: CLINIC | Age: 48
End: 2023-01-28
Payer: COMMERCIAL

## 2023-01-28 VITALS
DIASTOLIC BLOOD PRESSURE: 72 MMHG | RESPIRATION RATE: 18 BRPM | WEIGHT: 112 LBS | SYSTOLIC BLOOD PRESSURE: 110 MMHG | TEMPERATURE: 98.6 F | HEART RATE: 124 BPM | BODY MASS INDEX: 23.51 KG/M2 | OXYGEN SATURATION: 99 % | HEIGHT: 58 IN

## 2023-01-28 DIAGNOSIS — J11.1 INFLUENZA-LIKE ILLNESS: ICD-10-CM

## 2023-01-28 LAB
FLUAV+FLUBV AG SPEC QL IA: NEGATIVE
INT CON NEG: NORMAL
INT CON NEG: NORMAL
INT CON POS: NORMAL
INT CON POS: NORMAL
S PYO AG THROAT QL: NEGATIVE

## 2023-01-28 PROCEDURE — 87880 STREP A ASSAY W/OPTIC: CPT | Performed by: PHYSICIAN ASSISTANT

## 2023-01-28 PROCEDURE — 87804 INFLUENZA ASSAY W/OPTIC: CPT | Performed by: PHYSICIAN ASSISTANT

## 2023-01-28 PROCEDURE — 99213 OFFICE O/P EST LOW 20 MIN: CPT | Performed by: PHYSICIAN ASSISTANT

## 2023-01-28 RX ORDER — DEXTROMETHORPHAN HYDROBROMIDE AND PROMETHAZINE HYDROCHLORIDE 15; 6.25 MG/5ML; MG/5ML
5 SYRUP ORAL EVERY 4 HOURS PRN
Qty: 120 ML | Refills: 0 | Status: SHIPPED | OUTPATIENT
Start: 2023-01-28

## 2023-01-28 ASSESSMENT — ENCOUNTER SYMPTOMS
DIARRHEA: 0
ABDOMINAL PAIN: 0
SORE THROAT: 1
SHORTNESS OF BREATH: 0
DIZZINESS: 0
FEVER: 1
WHEEZING: 0
CHILLS: 1
MYALGIAS: 1
NAUSEA: 0
SINUS PAIN: 0
SPUTUM PRODUCTION: 0
DIAPHORESIS: 0
COUGH: 1
HEADACHES: 1
PALPITATIONS: 0
VOMITING: 0

## 2023-01-28 ASSESSMENT — FIBROSIS 4 INDEX: FIB4 SCORE: .939148550549911672

## 2023-01-29 NOTE — PROGRESS NOTES
Subjective:     CHIEF COMPLAINT  Chief Complaint   Patient presents with    Pharyngitis     X 2 days, sore throat, chills, cough, body aches, headache       HPI  Cheryl Sultana is a very pleasant 47 y.o. female who presents to the clinic with flulike symptoms x2 days.  Patient describes acute onset body aches, chills and subjective fever.  She also has congestion, cough and sore throat.  Describes mild pain when swallowing.  No difficulty swallowing or handling secretions.  Cough is becoming more frequent.  She is not producing any sputum.  No shortness of breath or chest pain.  No known ill contacts.  Tolerating oral intake without complication.    REVIEW OF SYSTEMS  Review of Systems   Constitutional:  Positive for chills, fever and malaise/fatigue. Negative for diaphoresis.   HENT:  Positive for congestion and sore throat. Negative for ear pain and sinus pain.    Respiratory:  Positive for cough. Negative for sputum production, shortness of breath and wheezing.    Cardiovascular:  Negative for chest pain and palpitations.   Gastrointestinal:  Negative for abdominal pain, diarrhea, nausea and vomiting.   Musculoskeletal:  Positive for myalgias.   Neurological:  Positive for headaches. Negative for dizziness.     PAST MEDICAL HISTORY  Patient Active Problem List    Diagnosis Date Noted    Pneumonia due to COVID-19 virus 09/22/2021    Elevated troponin 09/22/2021    Hypothyroidism following radioiodine therapy 08/10/2015    Hyperthyroidism 03/17/2015       SURGICAL HISTORY   has a past surgical history that includes tubal coagulation laparoscopic bilateral.    ALLERGIES  Allergies   Allergen Reactions    Methimazole Rash     Rash        CURRENT MEDICATIONS  Home Medications       Reviewed by Justus Soto P.A.-C. (Physician Assistant) on 01/28/23 at 1956  Med List Status: <None>     Medication Last Dose Status   aspirin EC (ECOTRIN) 325 MG Tablet Delayed Response Taking Active   Calcium-Magnesium-Zinc  "(MYRA-MAG-ZINC PO) Taking Active   carvedilol (COREG) 3.125 MG Tab Taking Active   levothyroxine (SYNTHROID) 50 MCG Tab Taking Active   liothyronine (CYTOMEL) 5 MCG Tab Taking Active   multivitamin (THERAGRAN) Tab Taking Active   Omega-3 Fatty Acids (OMEGA 3 PO) Taking Active   PAPAYA PO Taking Active   vitamin D3 (CHOLECALCIFEROL) 1000 Unit (25 mcg) Tab Taking Active                    SOCIAL HISTORY  Social History     Tobacco Use    Smoking status: Never    Smokeless tobacco: Never   Vaping Use    Vaping Use: Never used   Substance and Sexual Activity    Alcohol use: No    Drug use: Never    Sexual activity: Not on file       FAMILY HISTORY  Family History   Problem Relation Age of Onset    Thyroid Paternal Aunt         hyperthyroidism    Thyroid Paternal Aunt         hyperthyroidism    Thyroid Maternal Grandmother         goiter          Objective:     VITAL SIGNS: /72   Pulse (!) 124   Temp 37 °C (98.6 °F) (Temporal)   Resp 18   Ht 1.473 m (4' 10\")   Wt 50.8 kg (112 lb)   SpO2 99%   BMI 23.41 kg/m²     PHYSICAL EXAM  Physical Exam  Constitutional:       General: She is not in acute distress.     Appearance: Normal appearance. She is not ill-appearing, toxic-appearing or diaphoretic.   HENT:      Head: Normocephalic and atraumatic.      Right Ear: Tympanic membrane, ear canal and external ear normal.      Left Ear: Tympanic membrane, ear canal and external ear normal.      Nose: Congestion present. No rhinorrhea.      Mouth/Throat:      Mouth: Mucous membranes are moist.      Pharynx: Posterior oropharyngeal erythema present. No oropharyngeal exudate.   Eyes:      Conjunctiva/sclera: Conjunctivae normal.   Cardiovascular:      Rate and Rhythm: Regular rhythm. Tachycardia present.      Pulses: Normal pulses.      Heart sounds: Normal heart sounds.   Pulmonary:      Effort: Pulmonary effort is normal.      Breath sounds: Normal breath sounds. No wheezing, rhonchi or rales.   Musculoskeletal:      " Cervical back: Normal range of motion. No muscular tenderness.   Lymphadenopathy:      Cervical: No cervical adenopathy.   Skin:     General: Skin is warm and dry.      Capillary Refill: Capillary refill takes less than 2 seconds.   Neurological:      Mental Status: She is alert.   Psychiatric:         Mood and Affect: Mood normal.         Thought Content: Thought content normal.     POCT strep: Negative  POCT flu: Negative    Assessment/Plan:     1. Influenza-like illness  POCT Rapid Strep A    POCT Influenza A/B    promethazine-dextromethorphan (PROMETHAZINE-DM) 6.25-15 MG/5ML syrup            MDM/Comments:    Patient's findings are consistent with a flu type illness.  Tested negative for influenza in clinic today.  Negative strep test in clinic.  Respectfully declined COVID testing.  At this time advised continued supportive care.  Return precautions discussed.    -Discussed viral etiology of URI.     Symptomatic care.  -Oral hydration and rest.   -Over the counter expectorant as directed; Guaifenesin (Mucinex).  -Diphenhydramine as directed for rhinorrhea (runny nose) and sneezing.  --May take over the counter pseudoephedrine for nasal congestion. Can increase your blood pressure.  -Tylenol or ibuprofen for pain and fever as directed.   -Saline nasal spray as a decongestant.    Follow up for shortness of breath, fevers, elevated heart rate, weakness, prolonged cough, chest pain, or any other concerns.      Differential diagnosis, natural history, supportive care, and indications for immediate follow-up discussed. All questions answered. Patient agrees with the plan of care.    Follow-up as needed if symptoms worsen or fail to improve to PCP, Urgent care or Emergency Room.    I have personally reviewed prior external notes and test results pertinent to today's visit.  I have independently reviewed and interpreted all diagnostics ordered during this urgent care acute visit.   Discussed management options  (risks,benefits, and alternatives to treatment). Pt expresses understanding and the treatment plan was agreed upon. Questions were encouraged and answered to pt's satisfaction.    Please note that this dictation was created using voice recognition software. I have made a reasonable attempt to correct obvious errors, but I expect that there are errors of grammar and possibly content that I did not discover before finalizing the note.

## 2023-07-11 ENCOUNTER — APPOINTMENT (OUTPATIENT)
Dept: RADIOLOGY | Facility: MEDICAL CENTER | Age: 48
End: 2023-07-11
Attending: EMERGENCY MEDICINE

## 2023-07-11 ENCOUNTER — HOSPITAL ENCOUNTER (EMERGENCY)
Facility: MEDICAL CENTER | Age: 48
End: 2023-07-11
Attending: EMERGENCY MEDICINE

## 2023-07-11 VITALS
HEART RATE: 77 BPM | RESPIRATION RATE: 19 BRPM | BODY MASS INDEX: 22.58 KG/M2 | SYSTOLIC BLOOD PRESSURE: 164 MMHG | WEIGHT: 107.58 LBS | OXYGEN SATURATION: 95 % | TEMPERATURE: 98.3 F | HEIGHT: 58 IN | DIASTOLIC BLOOD PRESSURE: 97 MMHG

## 2023-07-11 DIAGNOSIS — R07.9 CHEST PAIN, UNSPECIFIED TYPE: ICD-10-CM

## 2023-07-11 DIAGNOSIS — I10 HYPERTENSION, UNSPECIFIED TYPE: ICD-10-CM

## 2023-07-11 DIAGNOSIS — R51.9 NONINTRACTABLE HEADACHE, UNSPECIFIED CHRONICITY PATTERN, UNSPECIFIED HEADACHE TYPE: ICD-10-CM

## 2023-07-11 LAB
ALBUMIN SERPL BCP-MCNC: 4 G/DL (ref 3.2–4.9)
ALBUMIN/GLOB SERPL: 1.3 G/DL
ALP SERPL-CCNC: 61 U/L (ref 30–99)
ALT SERPL-CCNC: 16 U/L (ref 2–50)
ANION GAP SERPL CALC-SCNC: 12 MMOL/L (ref 7–16)
AST SERPL-CCNC: 17 U/L (ref 12–45)
BASOPHILS # BLD AUTO: 0.7 % (ref 0–1.8)
BASOPHILS # BLD: 0.06 K/UL (ref 0–0.12)
BILIRUB SERPL-MCNC: 0.6 MG/DL (ref 0.1–1.5)
BUN SERPL-MCNC: 17 MG/DL (ref 8–22)
CALCIUM ALBUM COR SERPL-MCNC: 9.4 MG/DL (ref 8.5–10.5)
CALCIUM SERPL-MCNC: 9.4 MG/DL (ref 8.5–10.5)
CHLORIDE SERPL-SCNC: 102 MMOL/L (ref 96–112)
CO2 SERPL-SCNC: 24 MMOL/L (ref 20–33)
CREAT SERPL-MCNC: 0.76 MG/DL (ref 0.5–1.4)
EKG IMPRESSION: NORMAL
EOSINOPHIL # BLD AUTO: 0.13 K/UL (ref 0–0.51)
EOSINOPHIL NFR BLD: 1.5 % (ref 0–6.9)
ERYTHROCYTE [DISTWIDTH] IN BLOOD BY AUTOMATED COUNT: 39.8 FL (ref 35.9–50)
GFR SERPLBLD CREATININE-BSD FMLA CKD-EPI: 95 ML/MIN/1.73 M 2
GLOBULIN SER CALC-MCNC: 3.1 G/DL (ref 1.9–3.5)
GLUCOSE SERPL-MCNC: 106 MG/DL (ref 65–99)
HCG SERPL QL: NEGATIVE
HCT VFR BLD AUTO: 46.9 % (ref 37–47)
HGB BLD-MCNC: 16.2 G/DL (ref 12–16)
IMM GRANULOCYTES # BLD AUTO: 0.06 K/UL (ref 0–0.11)
IMM GRANULOCYTES NFR BLD AUTO: 0.7 % (ref 0–0.9)
LYMPHOCYTES # BLD AUTO: 1.3 K/UL (ref 1–4.8)
LYMPHOCYTES NFR BLD: 15.3 % (ref 22–41)
MCH RBC QN AUTO: 31 PG (ref 27–33)
MCHC RBC AUTO-ENTMCNC: 34.5 G/DL (ref 32.2–35.5)
MCV RBC AUTO: 89.7 FL (ref 81.4–97.8)
MONOCYTES # BLD AUTO: 0.61 K/UL (ref 0–0.85)
MONOCYTES NFR BLD AUTO: 7.2 % (ref 0–13.4)
NEUTROPHILS # BLD AUTO: 6.34 K/UL (ref 1.82–7.42)
NEUTROPHILS NFR BLD: 74.6 % (ref 44–72)
NRBC # BLD AUTO: 0 K/UL
NRBC BLD-RTO: 0 /100 WBC (ref 0–0.2)
NT-PROBNP SERPL IA-MCNC: 42 PG/ML (ref 0–125)
PLATELET # BLD AUTO: 272 K/UL (ref 164–446)
PMV BLD AUTO: 8.9 FL (ref 9–12.9)
POTASSIUM SERPL-SCNC: 4.2 MMOL/L (ref 3.6–5.5)
PROT SERPL-MCNC: 7.1 G/DL (ref 6–8.2)
RBC # BLD AUTO: 5.23 M/UL (ref 4.2–5.4)
SODIUM SERPL-SCNC: 138 MMOL/L (ref 135–145)
TROPONIN T SERPL-MCNC: <6 NG/L (ref 6–19)
WBC # BLD AUTO: 8.5 K/UL (ref 4.8–10.8)

## 2023-07-11 PROCEDURE — 84703 CHORIONIC GONADOTROPIN ASSAY: CPT

## 2023-07-11 PROCEDURE — 99285 EMERGENCY DEPT VISIT HI MDM: CPT

## 2023-07-11 PROCEDURE — 93005 ELECTROCARDIOGRAM TRACING: CPT | Performed by: EMERGENCY MEDICINE

## 2023-07-11 PROCEDURE — 84484 ASSAY OF TROPONIN QUANT: CPT

## 2023-07-11 PROCEDURE — 80053 COMPREHEN METABOLIC PANEL: CPT

## 2023-07-11 PROCEDURE — 36415 COLL VENOUS BLD VENIPUNCTURE: CPT

## 2023-07-11 PROCEDURE — 700102 HCHG RX REV CODE 250 W/ 637 OVERRIDE(OP): Performed by: EMERGENCY MEDICINE

## 2023-07-11 PROCEDURE — 700117 HCHG RX CONTRAST REV CODE 255: Performed by: EMERGENCY MEDICINE

## 2023-07-11 PROCEDURE — A9270 NON-COVERED ITEM OR SERVICE: HCPCS | Performed by: EMERGENCY MEDICINE

## 2023-07-11 PROCEDURE — 71045 X-RAY EXAM CHEST 1 VIEW: CPT

## 2023-07-11 PROCEDURE — 85025 COMPLETE CBC W/AUTO DIFF WBC: CPT

## 2023-07-11 PROCEDURE — 70496 CT ANGIOGRAPHY HEAD: CPT

## 2023-07-11 PROCEDURE — 83880 ASSAY OF NATRIURETIC PEPTIDE: CPT

## 2023-07-11 PROCEDURE — 93005 ELECTROCARDIOGRAM TRACING: CPT

## 2023-07-11 RX ORDER — CARVEDILOL 3.12 MG/1
3.12 TABLET ORAL ONCE
Status: COMPLETED | OUTPATIENT
Start: 2023-07-11 | End: 2023-07-11

## 2023-07-11 RX ORDER — AMLODIPINE BESYLATE 5 MG/1
5 TABLET ORAL ONCE
Status: COMPLETED | OUTPATIENT
Start: 2023-07-11 | End: 2023-07-11

## 2023-07-11 RX ORDER — ACETAMINOPHEN 500 MG
1000 TABLET ORAL ONCE
Status: COMPLETED | OUTPATIENT
Start: 2023-07-11 | End: 2023-07-11

## 2023-07-11 RX ORDER — AMLODIPINE BESYLATE 5 MG/1
5 TABLET ORAL DAILY
Qty: 30 TABLET | Refills: 0 | Status: SHIPPED | OUTPATIENT
Start: 2023-07-11

## 2023-07-11 RX ADMIN — CARVEDILOL 3.12 MG: 3.12 TABLET, FILM COATED ORAL at 15:46

## 2023-07-11 RX ADMIN — ACETAMINOPHEN 1000 MG: 500 TABLET, FILM COATED ORAL at 16:56

## 2023-07-11 RX ADMIN — IOHEXOL 90 ML: 350 INJECTION, SOLUTION INTRAVENOUS at 16:00

## 2023-07-11 RX ADMIN — AMLODIPINE BESYLATE 5 MG: 5 TABLET ORAL at 16:56

## 2023-07-11 ASSESSMENT — HEART SCORE
HISTORY: SLIGHTLY SUSPICIOUS
RISK FACTORS: 1-2 RISK FACTORS
TROPONIN: LESS THAN OR EQUAL TO NORMAL LIMIT
HEART SCORE: 2
AGE: 45-64
ECG: NORMAL

## 2023-07-11 ASSESSMENT — FIBROSIS 4 INDEX: FIB4 SCORE: .95831484749990987

## 2023-07-11 ASSESSMENT — PAIN DESCRIPTION - PAIN TYPE: TYPE: ACUTE PAIN

## 2023-07-11 NOTE — ED NOTES
Patient to [facility]. Wished to use bathroom. Ambulated to bathroom. Collecting urine sample if needed.

## 2023-07-11 NOTE — ED TRIAGE NOTES
"Chief Complaint   Patient presents with    Chest Pain     Patient reports intermittent L side chest pain intermittent for a few weeks. Patient reports pain radiates into L shoulder    Headache     Patient reports R side JOHNSON intermittent for a few weeks.        49 yo female to triage for above complaint.   Patient noted to be hypertensive on arrival, states she takes carvedilol and is taking it as prescribed.  EKG complete. Protocol ordered.    Pt is alert and oriented, speaking in full sentences, follows commands and responds appropriately to questions.     Patient placed back in lobby and educated on triage process. Asked to inform RN of any changes.    BP (!) 175/115   Pulse (!) 103   Temp 36.8 °C (98.3 °F) (Temporal)   Resp 16   Ht 1.473 m (4' 10\")   Wt 48.8 kg (107 lb 9.4 oz)   SpO2 98%   BMI 22.49 kg/m²     "

## 2023-07-11 NOTE — ED PROVIDER NOTES
Emergency Physician Note    Chief Concern:  Chief Complaint   Patient presents with    Chest Pain     Patient reports intermittent L side chest pain intermittent for a few weeks. Patient reports pain radiates into L shoulder    Headache     Patient reports R side JOHNSON intermittent for a few weeks.        HPI/ROS     External Records Reviewed:  Most recent visit with our system was an urgent care visit on 1/28/2023.  Physician assistant note reviewed from that visit.  She was seen for evidence of flulike symptoms and pharyngitis.  No report of chest pain at that visit.  Medication list reviewed at that visit, she is currently on carvedilol, Synthroid, and aspirin 325 mg.  Symptoms were thought to be viral, COVID testing was declined in clinic.  Symptomatic care recommended.    HPI:  Cheryl Sultana is a 48 y.o. female who presents to the emergency department today for evaluation of chest pain and headache.   1.  Headache  She describes a progressively worsening right-sided headache that has been present for about 3 to 4 weeks.  She describes gradual onset, initially pain was not very severe, however pain has steadily worsened since that time.  She states that she has had periods where the pain seems to be improved, however her headache has not completely resolved within the last month.  She reports no prior history of similar headaches, no prior history of migraines.  She reports no recent fevers, no thunderclap onset.  She states at times she does have associated drooping of her right eyelid, that symptom is not currently present.  She reports that the severity of her headache is steadily worsening, and she is noticing that her worsening pain has become more frequent and longer duration.    2.  Chest pain  She describes substernal chest pain that has been intermittent for the past 3 weeks, not currently present on arrival to the emergency department.  She states that she most recently felt that pain while  she was driving early this morning, several hours prior to arrival.  Pain does not seem to be exertional, she has no pain radiating to the thoracic back, no pain radiating to the jaw.  At times she feels as though her neck feels cold, though not necessarily as though the pain is radiating through the neck.     She describes no recent fevers, no leg pain, no weakness, no strokelike symptoms.  She does not report a history of DVT requiring anticoagulation, but did say she had some blood clots while diagnosed with COVID-19 several years ago, she states this was due to coughing up blood.  I reviewed her discharge summary from 9/23/2021 which notes that she had a CTA of the chest without pulmonary embolism.    PAST MEDICAL HISTORY  Past Medical History:   Diagnosis Date    Hypertension     Thyroid disease      SURGICAL HISTORY  Past Surgical History:   Procedure Laterality Date    TUBAL COAGULATION LAPAROSCOPIC BILATERAL       FAMILY HISTORY  Family History   Problem Relation Age of Onset    Thyroid Paternal Aunt         hyperthyroidism    Thyroid Paternal Aunt         hyperthyroidism    Thyroid Maternal Grandmother         goiter     SOCIAL HISTORY   reports that she has never smoked. She has never used smokeless tobacco. She reports that she does not drink alcohol and does not use drugs.    CURRENT MEDICATIONS  Previous Medications    ASPIRIN EC (ECOTRIN) 325 MG TABLET DELAYED RESPONSE    Take 325 mg by mouth one time.    CALCIUM-MAGNESIUM-ZINC (MYRA-MAG-ZINC PO)    Take 1 Tablet by mouth every day.    CARVEDILOL (COREG) 3.125 MG TAB    Take 3.125 mg by mouth 2 times a day as needed. Takes 3.125 mg in the AM daily   Takes 3.125 mg in the PM if needed    LEVOTHYROXINE (SYNTHROID) 50 MCG TAB    Take  mcg by mouth every morning on an empty stomach. 100 mcg on Monday, Wednesday, and Friday   75 mcg all other days    LIOTHYRONINE (CYTOMEL) 5 MCG TAB    Take 10 mcg by mouth every day. 2 tablets = 10 mcg    MULTIVITAMIN  "(THERAGRAN) TAB    Take 1 Tablet by mouth every day.    OMEGA-3 FATTY ACIDS (OMEGA 3 PO)    Take 1 Tablet by mouth every day.    PAPAYA PO    Take 1 Tablet by mouth every day.    PROMETHAZINE-DEXTROMETHORPHAN (PROMETHAZINE-DM) 6.25-15 MG/5ML SYRUP    Take 5 mL by mouth every four hours as needed for Cough.    VITAMIN D3 (CHOLECALCIFEROL) 1000 UNIT (25 MCG) TAB    Take 1,000 Units by mouth every day.     ALLERGIES  Methimazole    PHYSICAL EXAM  Vital Signs: BP (!) 209/106   Pulse 82   Temp 36.8 °C (98.3 °F) (Temporal)   Resp (!) 22   Ht 1.473 m (4' 10\")   Wt 48.8 kg (107 lb 9.4 oz)   SpO2 95%   BMI 22.49 kg/m²   Constitutional: Alert, no acute distress  HENT: Normocephalic, atraumatic.  Cardiovascular: No tachycardia, regular rate and rhythm, no murmur appreciated  Pulmonary: No respiratory distress, normal work of breathing, breath sounds clear and equal bilaterally  Abdomen: Soft, non tender, no peritoneal signs.  Skin: Warm, dry, no rashes or lesions  Musculoskeletal: Normal range of motion in all extremities, no swelling or deformity noted  Neurologic: CN II-XII intact, speech normal, muscle strength 5/5 in all four extremities, sensation grossly intact, no facial droop, no ptosis, no asymmetry  Psychiatric: Normal and appropriate mood and affect    Diagnostic Studies & Procedures    Labs:  All labs reviewed by me as noted below.    EK Lead EKG interpreted by me as noted below  Results for orders placed or performed during the hospital encounter of 23   EKG   Result Value Ref Range    Report       [facility] Emergency Dept.    Test Date:  2023  Pt Name:    MATT MALDONADO          Department: ER  MRN:        [id number]                      Room:       [facility]  Gender:     Female                       Technician: [id number]  :        1975                   Requested By:ER TRIAGE PROTOCOL  Order #:    [id number]                    Reading MD: RUBEN GARCIA, " MD    Measurements  Intervals                                Axis  Rate:       93                           P:          89  NY:         126                          QRS:        53  QRSD:       79                           T:          41  QT:         362  QTc:        451    Interpretive Statements  Rate 93, Normal sinus rhythm, no ST elevations nor depressions, there is some  T wave flattening in lead III which is unchanged from prior, no pathologic T  wave inversions, no ectopy.  Electronically Signed On 07- 14:14:20 PDT by RUBEN GARCIA MD       Radiology:  The attending Emergency Physician has independently interpreted the following imaging:  I independently interpreted the CT of the head, I do not appreciate any large intracranial mass, no midline shift.    DX-CHEST-PORTABLE (1 VIEW)   Final Result      No acute cardiac or pulmonary abnormalities are identified.      CT-CTV HEAD WITH & W/O POST PROCESS    (Results Pending)     Course and Medical Decision Making    ED Observation Status? Yes; Differential diagnosis includes severe or life threatening conditions including: Intracranial mass, dural sinus thrombus.  Due to diagnostic uncertainty at this time, as well as therapeutic intensity, patient placed in observation status at 2:09 PM, 7/11/2023.     Observation plan is as follows: Advanced imaging, ongoing cardiac monitoring, serial reassessments, specialist consultation if necessary    Initial Assessment and Plan  Ms. Sultana presents to the emergency department for evaluation of headache and chest discomfort.  Her primary concern is headache which has been ongoing and worsening for the past 3 to 4 weeks.  She also reports some intermittent chest pain, which has been ongoing for several weeks as well, not currently present in the emergency department.  Headache is concerning as she has no prior history of similar symptoms, pain has been progressively worsening for a few weeks.  Pain has not  responded well to over-the-counter analgesics.  She does not have any focal neurologic deficits on arrival to the emergency department.  She was hypertensive, but states that she forgot to take her antihypertensive medications this morning.  Her home dose of Coreg was administered in the emergency department.  She has no history of fever, vital signs are less concerning for Sirs or sepsis.    On laboratory evaluation she has a normal CMP, no significant electrolyte abnormalities, no sodium derangement to explain her headache.  High-sensitivity troponin and proBNP are within normal limits.  As her chest pain occurred several hours ago and has not been stuttering with no new chest pain, at this time I do not believe she requires serial cardiac enzymes.  Chest pain is not exertional, doubt cardiac etiology at this time.  Heart score is 2 indicating low risk of major adverse cardiac event.  She has normal white blood count, again less concerning for infectious etiology.  Hemoglobin is 16.2, no evidence of symptomatic anemia.  No significant abnormalities on differential.    Chest x-ray is negative for acute process.    CT CTV is negative for acute intracranial abnormality, no mass effect or midline shift, no hydrocephalus.  No evidence of dural venous sinus thrombosis.    Blood pressure initially improved with her home dose of Coreg, however blood pressure began to climb again.  She was given a 5 mg dose of Norvasc as well as Tylenol for headache.  She remains without neurologic deficits.    At shift change, she has not had any new or worsening symptoms, she will require reevaluation after receiving Norvasc and Tylenol.  If headache resolves and blood pressure improves I believe she can be discharged with a prescription for this medication and instructions to call her primary care physician tomorrow.  If we are unable to lower her blood pressure, or resolve headache, she may require admission for blood pressure  medication adjustment and further treatment of hypertension.  Care will be turned over to oncoming physician to assess response to therapy.    Additional Problems and Disposition      Decision tools and prescription drugs considered including, but not limited to:   1.  Heart score -patient with low risk chest pain, do not believe admission for cardiac evaluation is necessary at this time, stable for outpatient follow-up with return precautions.    Disposition:  Pending response to therapeutic intervention    Problem list:  No diagnosis found.    IYara (Champibe), am scribing for, and in the presence of, Juliana Guillen M.D..    Electronically signed by: Yara Joyce (Champibcristina), 7/11/2023    IJuliana M.D. personally performed the services described in this documentation, as scribed by Yara Joyce in my presence, and it is both accurate and complete.    The note accurately reflects work and decisions made by me.  Juliana Guillen M.D.  7/11/2023  5:41 PM

## 2023-07-11 NOTE — ED NOTES
Pt is resting on gurney.Pt is A&Ox4. Pt is on monitor. RR regular and unlabored. BP is elevated 209/106. Call light is within reach.

## 2023-07-12 NOTE — DISCHARGE INSTRUCTIONS
Please call your primary care clinic at the opening of business hours to discuss your blood pressure medications.  Return to the emergency department if you develop any new or worsening symptoms including chest pain, headache, nausea, vomiting, or any further concerns.

## 2023-07-12 NOTE — DISCHARGE SUMMARY
"  ED Observation Discharge Summary    Patient:Cheryl Sultana  Patient : 1975  Patient MRN: [id number]  Patient PCP: LYNDA Nesbitt    Admit Date: 2023  Discharge Date and Time: 23 6:17 PM  Discharge Diagnosis:   1. Nonintractable headache, unspecified chronicity pattern, unspecified headache type        2. Chest pain, unspecified type        3. Hypertension, unspecified type  amLODIPine (NORVASC) 5 MG Tab          Discharge Attending: Arcenio Taylor M.D.  Discharge Service: ED Observation    ED Course  Cheryl is a 48 y.o. female who was evaluated at [facility] for headache, chest pain, elevated blood pressure.  Patient had reassuring work-up, including blood work showing no signs of acute kidney injury, negative troponin, reassuring EKG, CT/CTV head with no signs of intracranial bleed or venous sinus thrombosis, reassuring chest x-ray.  Patient was treated with amlodipine with improvement in her blood pressure.  Her symptoms have improved and is safe for discharge.  Will be discharged home with amlodipine.    Discharge Exam:  BP (!) 188/110   Pulse 100   Temp 36.8 °C (98.3 °F) (Temporal)   Resp 18   Ht 1.473 m (4' 10\")   Wt 48.8 kg (107 lb 9.4 oz)   SpO2 97%   BMI 22.49 kg/m² .    Constitutional: Awake and alert. Nontoxic  HENT:  Grossly normal  Eyes: Grossly normal  Neck: Normal range of motion  Cardiovascular: Normal heart rate   Thorax & Lungs: No respiratory distress  Abdomen: Nontender  Skin:  No pathologic rash.   Extremities: Well perfused  Psychiatric: Affect normal    Labs  Results for orders placed or performed during the hospital encounter of 23   CBC with Differential   Result Value Ref Range    WBC 8.5 4.8 - 10.8 K/uL    RBC 5.23 4.20 - 5.40 M/uL    Hemoglobin 16.2 (H) 12.0 - 16.0 g/dL    Hematocrit 46.9 37.0 - 47.0 %    MCV 89.7 81.4 - 97.8 fL    MCH 31.0 27.0 - 33.0 pg    MCHC 34.5 32.2 - 35.5 g/dL    RDW 39.8 35.9 - 50.0 fL    Platelet Count 272 " 164 - 446 K/uL    MPV 8.9 (L) 9.0 - 12.9 fL    Neutrophils-Polys 74.60 (H) 44.00 - 72.00 %    Lymphocytes 15.30 (L) 22.00 - 41.00 %    Monocytes 7.20 0.00 - 13.40 %    Eosinophils 1.50 0.00 - 6.90 %    Basophils 0.70 0.00 - 1.80 %    Immature Granulocytes 0.70 0.00 - 0.90 %    Nucleated RBC 0.00 0.00 - 0.20 /100 WBC    Neutrophils (Absolute) 6.34 1.82 - 7.42 K/uL    Lymphs (Absolute) 1.30 1.00 - 4.80 K/uL    Monos (Absolute) 0.61 0.00 - 0.85 K/uL    Eos (Absolute) 0.13 0.00 - 0.51 K/uL    Baso (Absolute) 0.06 0.00 - 0.12 K/uL    Immature Granulocytes (abs) 0.06 0.00 - 0.11 K/uL    NRBC (Absolute) 0.00 K/uL   Complete Metabolic Panel (CMP)   Result Value Ref Range    Sodium 138 135 - 145 mmol/L    Potassium 4.2 3.6 - 5.5 mmol/L    Chloride 102 96 - 112 mmol/L    Co2 24 20 - 33 mmol/L    Anion Gap 12.0 7.0 - 16.0    Glucose 106 (H) 65 - 99 mg/dL    Bun 17 8 - 22 mg/dL    Creatinine 0.76 0.50 - 1.40 mg/dL    Calcium 9.4 8.5 - 10.5 mg/dL    AST(SGOT) 17 12 - 45 U/L    ALT(SGPT) 16 2 - 50 U/L    Alkaline Phosphatase 61 30 - 99 U/L    Total Bilirubin 0.6 0.1 - 1.5 mg/dL    Albumin 4.0 3.2 - 4.9 g/dL    Total Protein 7.1 6.0 - 8.2 g/dL    Globulin 3.1 1.9 - 3.5 g/dL    A-G Ratio 1.3 g/dL   Troponins NOW   Result Value Ref Range    Troponin T <6 6 - 19 ng/L   HCG Qual Serum   Result Value Ref Range    Beta-Hcg Qualitative Serum Negative Negative   proBrain Natriuretic Peptide, NT   Result Value Ref Range    NT-proBNP 42 0 - 125 pg/mL   CORRECTED CALCIUM   Result Value Ref Range    Correct Calcium 9.4 8.5 - 10.5 mg/dL   ESTIMATED GFR   Result Value Ref Range    GFR (CKD-EPI) 95 >60 mL/min/1.73 m 2   EKG   Result Value Ref Range    Report       [facility] Emergency Dept.    Test Date:  2023  Pt Name:    MATT MALDONADO          Department: ER  MRN:        [id number]                      Room:       [facility]  Gender:     Female                       Technician: [id number]  :        1975                    Requested By:ER TRIAGE PROTOCOL  Order #:    [id number]                    Reading MD: RUBEN GARCIA MD    Measurements  Intervals                                Axis  Rate:       93                           P:          89  HI:         126                          QRS:        53  QRSD:       79                           T:          41  QT:         362  QTc:        451    Interpretive Statements  Rate 93, Normal sinus rhythm, no ST elevations nor depressions, there is some  T wave flattening in lead III which is unchanged from prior, no pathologic T  wave inversions, no ectopy.  Electronically Signed On 07- 14:14:20 PDT by RUBEN GARCIA MD         Radiology  CT-CTV HEAD WITH & W/O POST PROCESS   Final Result      There is no evidence of dural venous sinus thrombosis.      No acute intracranial abnormality.      DX-CHEST-PORTABLE (1 VIEW)   Final Result      No acute cardiac or pulmonary abnormalities are identified.          Medications:   New Prescriptions    AMLODIPINE (NORVASC) 5 MG TAB    Take 1 Tablet by mouth every day.       My final assessment includes   1. Nonintractable headache, unspecified chronicity pattern, unspecified headache type        2. Chest pain, unspecified type        3. Hypertension, unspecified type  amLODIPine (NORVASC) 5 MG Tab          Upon Reevaluation, the patient's condition has: Improved; and will be discharged.    Patient discharged from ED Observation status at 6:18 PM (Time) 7/11/2023 (Date).     Total time spent on this ED Observation discharge encounter is < 30 Minutes    Electronically signed by: Arcenio Taylor M.D., 7/11/2023 6:17 PM

## 2023-07-12 NOTE — ED NOTES
Cheryl Sultana discharged via ambulation with self, reports comfortable with DC plan.  Discharge instructions given and reviewed, patient educated to follow up with PCP, verbalized understanding.  Prescriptions given x 1, send electronically.  All personal belongings in possession.  No questions at this time.

## 2023-09-12 NOTE — ED NOTES
Pt ambulated to bathroom. Then to perform visual acuity exam. See below for assessment results.    Detail Level: Simple Detail Level: Zone Sunscreen Recommendations: Encouraged use of zinc based sunscreen, wide brimmed hats, and SPF protected clothing while in the sun, as it has been proven to prevent further photo damage from the sun.

## 2023-11-09 ENCOUNTER — OFFICE VISIT (OUTPATIENT)
Dept: URGENT CARE | Facility: CLINIC | Age: 48
End: 2023-11-09

## 2023-11-09 VITALS
WEIGHT: 110.4 LBS | HEART RATE: 85 BPM | BODY MASS INDEX: 23.18 KG/M2 | SYSTOLIC BLOOD PRESSURE: 148 MMHG | HEIGHT: 58 IN | TEMPERATURE: 97.3 F | OXYGEN SATURATION: 96 % | RESPIRATION RATE: 14 BRPM | DIASTOLIC BLOOD PRESSURE: 92 MMHG

## 2023-11-09 DIAGNOSIS — R09.81 SINUS CONGESTION: ICD-10-CM

## 2023-11-09 DIAGNOSIS — I10 HYPERTENSION, UNSPECIFIED TYPE: ICD-10-CM

## 2023-11-09 DIAGNOSIS — J32.9 BACTERIAL SINUSITIS: ICD-10-CM

## 2023-11-09 DIAGNOSIS — B96.89 BACTERIAL SINUSITIS: ICD-10-CM

## 2023-11-09 PROCEDURE — 3077F SYST BP >= 140 MM HG: CPT | Performed by: PHYSICIAN ASSISTANT

## 2023-11-09 PROCEDURE — 99214 OFFICE O/P EST MOD 30 MIN: CPT | Performed by: PHYSICIAN ASSISTANT

## 2023-11-09 PROCEDURE — 3080F DIAST BP >= 90 MM HG: CPT | Performed by: PHYSICIAN ASSISTANT

## 2023-11-09 RX ORDER — AMOXICILLIN AND CLAVULANATE POTASSIUM 875; 125 MG/1; MG/1
1 TABLET, FILM COATED ORAL 2 TIMES DAILY
Qty: 20 TABLET | Refills: 0 | Status: SHIPPED | OUTPATIENT
Start: 2023-11-09

## 2023-11-09 ASSESSMENT — ENCOUNTER SYMPTOMS
SINUS PAIN: 1
SORE THROAT: 1
ABDOMINAL PAIN: 0
COUGH: 1
FEVER: 1
DIARRHEA: 0
SPUTUM PRODUCTION: 0
CHILLS: 1
MYALGIAS: 1
SHORTNESS OF BREATH: 0
NAUSEA: 0
WHEEZING: 0
VOMITING: 0

## 2023-11-09 ASSESSMENT — FIBROSIS 4 INDEX: FIB4 SCORE: .78125

## 2023-11-09 NOTE — PROGRESS NOTES
Subjective:   Cheryl Sultana  is a 48 y.o. female who presents for Sinus Problem (X 7 days, sinus pressure, mucus, sore throat, cough, fever, chills, congestion, runny nose)      Sinus Problem  This is a new problem. The current episode started 1 to 4 weeks ago. Associated symptoms include chills, congestion, coughing and a sore throat. Pertinent negatives include no ear pain or shortness of breath.   Patient presents urgent care complaining of a plus days of symptoms of sinus congestion and pressure.  She states she has had 3 to 4 days of fever with a max temp over 101.  She notes coughing that is fairly dry and somewhat spastic at night.  Denies ear pain.  She complains of fatigue and feeling rundown.  She notes mild irritation to throat.  She denies nausea vomiting abdominal pain diarrhea or rash.  She does have a history of some recurrence of sinusitis.  She has seen ENT for this in the past.  She denies a history of allergic rhinitis.  She has used some Flonase for treatment of symptoms thus far.  She complains of pressure pain over sinuses and dentition.    Patient is found to be hypertensive in clinic.  She does have a history of Graves' disease and is working with her primary care adjusting her thyroid medications.  She has felt slight palpitations now and historically (for which she takes carvedilol) but denies headache chest pain or shortness of breath today.  She does follow-up with PCP for management of these additional conditions.    Review of Systems   Constitutional:  Positive for chills and fever.   HENT:  Positive for congestion, sinus pain and sore throat. Negative for ear pain.    Respiratory:  Positive for cough. Negative for sputum production, shortness of breath and wheezing.    Cardiovascular:  Negative for chest pain.   Gastrointestinal:  Negative for abdominal pain, diarrhea, nausea and vomiting.   Musculoskeletal:  Positive for myalgias.   Skin:  Negative for rash.  "      Allergies   Allergen Reactions    Methimazole Rash     Rash         Objective:   BP (!) 162/98   Pulse 85   Temp 36.3 °C (97.3 °F) (Temporal)   Resp 14   Ht 1.473 m (4' 10\")   Wt 50.1 kg (110 lb 6.4 oz)   SpO2 96%   BMI 23.07 kg/m²     Physical Exam  Vitals and nursing note reviewed.   Constitutional:       General: She is not in acute distress.     Appearance: She is well-developed. She is not diaphoretic.   HENT:      Head: Normocephalic and atraumatic.      Right Ear: Tympanic membrane, ear canal and external ear normal.      Left Ear: Tympanic membrane, ear canal and external ear normal.      Nose:      Right Sinus: Maxillary sinus tenderness present. No frontal sinus tenderness.      Left Sinus: Maxillary sinus tenderness and frontal sinus tenderness present.      Mouth/Throat:      Lips: Pink.      Mouth: Mucous membranes are moist.      Pharynx: Uvula midline. Posterior oropharyngeal erythema ( mild PND) present. No oropharyngeal exudate.      Tonsils: No tonsillar abscesses.   Eyes:      General: Lids are normal. No scleral icterus.        Right eye: No discharge.         Left eye: No discharge.      Conjunctiva/sclera: Conjunctivae normal.   Pulmonary:      Effort: Pulmonary effort is normal. No respiratory distress.      Breath sounds: Normal breath sounds. No stridor. No decreased breath sounds, wheezing, rhonchi or rales.   Musculoskeletal:         General: Normal range of motion.      Cervical back: Neck supple.   Skin:     General: Skin is warm and dry.      Coloration: Skin is not pale.      Findings: No erythema.   Neurological:      Mental Status: She is alert and oriented to person, place, and time. She is not disoriented.   Psychiatric:         Speech: Speech normal.         Behavior: Behavior normal.       Blood pressure recheck: 148/92    Assessment/Plan:   1. Sinus congestion    2. Hypertension, unspecified type    3. Bacterial sinusitis  - amoxicillin-clavulanate (AUGMENTIN) " 875-125 MG Tab; Take 1 Tablet by mouth 2 times a day.  Dispense: 20 Tablet; Refill: 0  Supportive care is reviewed with patient/caregiver - recommend to push PO fluids and electrolytes,  take full course of Rx, take with probiotics, observe for resolution  Return to clinic with lack of resolution or progression of symptoms.  I reviewed additional OTC treatments for sinus congestion by way of medicines for allergies.  Recommend follow-up with PCP for management of Graves', hypertension and palpitations.  With any acute worsening ER evaluation is recommended.    I have worn an N95 mask, gloves and eye protection for the entire encounter with this patient.     Differential diagnosis, natural history, supportive care, and indications for immediate follow-up discussed.

## 2023-12-20 ENCOUNTER — HOSPITAL ENCOUNTER (OUTPATIENT)
Dept: RADIOLOGY | Facility: MEDICAL CENTER | Age: 48
End: 2023-12-20
Attending: NURSE PRACTITIONER

## 2023-12-20 DIAGNOSIS — E03.9 HYPOTHYROIDISM, UNSPECIFIED TYPE: ICD-10-CM

## 2024-01-02 ENCOUNTER — HOSPITAL ENCOUNTER (EMERGENCY)
Facility: MEDICAL CENTER | Age: 49
End: 2024-01-02
Attending: STUDENT IN AN ORGANIZED HEALTH CARE EDUCATION/TRAINING PROGRAM
Payer: COMMERCIAL

## 2024-01-02 ENCOUNTER — APPOINTMENT (OUTPATIENT)
Dept: RADIOLOGY | Facility: MEDICAL CENTER | Age: 49
End: 2024-01-02
Attending: STUDENT IN AN ORGANIZED HEALTH CARE EDUCATION/TRAINING PROGRAM
Payer: COMMERCIAL

## 2024-01-02 VITALS
RESPIRATION RATE: 12 BRPM | BODY MASS INDEX: 22.72 KG/M2 | SYSTOLIC BLOOD PRESSURE: 134 MMHG | OXYGEN SATURATION: 94 % | HEIGHT: 58 IN | WEIGHT: 108.25 LBS | HEART RATE: 77 BPM | TEMPERATURE: 97.6 F | DIASTOLIC BLOOD PRESSURE: 87 MMHG

## 2024-01-02 DIAGNOSIS — R00.2 PALPITATIONS: ICD-10-CM

## 2024-01-02 DIAGNOSIS — I10 HYPERTENSION, UNSPECIFIED TYPE: ICD-10-CM

## 2024-01-02 DIAGNOSIS — R07.9 CHEST PAIN, UNSPECIFIED TYPE: ICD-10-CM

## 2024-01-02 LAB
ALBUMIN SERPL BCP-MCNC: 4.2 G/DL (ref 3.2–4.9)
ALBUMIN/GLOB SERPL: 1.3 G/DL
ALP SERPL-CCNC: 64 U/L (ref 30–99)
ALT SERPL-CCNC: 18 U/L (ref 2–50)
ANION GAP SERPL CALC-SCNC: 12 MMOL/L (ref 7–16)
AST SERPL-CCNC: 20 U/L (ref 12–45)
BASOPHILS # BLD AUTO: 1 % (ref 0–1.8)
BASOPHILS # BLD: 0.09 K/UL (ref 0–0.12)
BILIRUB SERPL-MCNC: 0.3 MG/DL (ref 0.1–1.5)
BUN SERPL-MCNC: 24 MG/DL (ref 8–22)
CALCIUM ALBUM COR SERPL-MCNC: 9.3 MG/DL (ref 8.5–10.5)
CALCIUM SERPL-MCNC: 9.5 MG/DL (ref 8.5–10.5)
CHLORIDE SERPL-SCNC: 100 MMOL/L (ref 96–112)
CO2 SERPL-SCNC: 26 MMOL/L (ref 20–33)
CREAT SERPL-MCNC: 1.04 MG/DL (ref 0.5–1.4)
EKG IMPRESSION: NORMAL
EOSINOPHIL # BLD AUTO: 0.52 K/UL (ref 0–0.51)
EOSINOPHIL NFR BLD: 5.8 % (ref 0–6.9)
ERYTHROCYTE [DISTWIDTH] IN BLOOD BY AUTOMATED COUNT: 39.9 FL (ref 35.9–50)
GFR SERPLBLD CREATININE-BSD FMLA CKD-EPI: 65 ML/MIN/1.73 M 2
GLOBULIN SER CALC-MCNC: 3.2 G/DL (ref 1.9–3.5)
GLUCOSE SERPL-MCNC: 97 MG/DL (ref 65–99)
HCG SERPL QL: NEGATIVE
HCT VFR BLD AUTO: 52.5 % (ref 37–47)
HGB BLD-MCNC: 17.3 G/DL (ref 12–16)
IMM GRANULOCYTES # BLD AUTO: 0.15 K/UL (ref 0–0.11)
IMM GRANULOCYTES NFR BLD AUTO: 1.7 % (ref 0–0.9)
LYMPHOCYTES # BLD AUTO: 2.21 K/UL (ref 1–4.8)
LYMPHOCYTES NFR BLD: 24.8 % (ref 22–41)
MCH RBC QN AUTO: 30.2 PG (ref 27–33)
MCHC RBC AUTO-ENTMCNC: 33 G/DL (ref 32.2–35.5)
MCV RBC AUTO: 91.8 FL (ref 81.4–97.8)
MONOCYTES # BLD AUTO: 0.73 K/UL (ref 0–0.85)
MONOCYTES NFR BLD AUTO: 8.2 % (ref 0–13.4)
NEUTROPHILS # BLD AUTO: 5.21 K/UL (ref 1.82–7.42)
NEUTROPHILS NFR BLD: 58.5 % (ref 44–72)
NRBC # BLD AUTO: 0 K/UL
NRBC BLD-RTO: 0 /100 WBC (ref 0–0.2)
PLATELET # BLD AUTO: 276 K/UL (ref 164–446)
PMV BLD AUTO: 8.9 FL (ref 9–12.9)
POTASSIUM SERPL-SCNC: 4.1 MMOL/L (ref 3.6–5.5)
PROT SERPL-MCNC: 7.4 G/DL (ref 6–8.2)
RBC # BLD AUTO: 5.72 M/UL (ref 4.2–5.4)
SODIUM SERPL-SCNC: 138 MMOL/L (ref 135–145)
T4 FREE SERPL-MCNC: 0.99 NG/DL (ref 0.93–1.7)
TROPONIN T SERPL-MCNC: <6 NG/L (ref 6–19)
TROPONIN T SERPL-MCNC: <6 NG/L (ref 6–19)
TSH SERPL DL<=0.005 MIU/L-ACNC: 0.01 UIU/ML (ref 0.38–5.33)
WBC # BLD AUTO: 8.9 K/UL (ref 4.8–10.8)

## 2024-01-02 PROCEDURE — 93005 ELECTROCARDIOGRAM TRACING: CPT | Performed by: STUDENT IN AN ORGANIZED HEALTH CARE EDUCATION/TRAINING PROGRAM

## 2024-01-02 PROCEDURE — 93005 ELECTROCARDIOGRAM TRACING: CPT

## 2024-01-02 PROCEDURE — 84703 CHORIONIC GONADOTROPIN ASSAY: CPT

## 2024-01-02 PROCEDURE — 84484 ASSAY OF TROPONIN QUANT: CPT

## 2024-01-02 PROCEDURE — 99285 EMERGENCY DEPT VISIT HI MDM: CPT

## 2024-01-02 PROCEDURE — 80053 COMPREHEN METABOLIC PANEL: CPT

## 2024-01-02 PROCEDURE — 700105 HCHG RX REV CODE 258: Performed by: STUDENT IN AN ORGANIZED HEALTH CARE EDUCATION/TRAINING PROGRAM

## 2024-01-02 PROCEDURE — 85025 COMPLETE CBC W/AUTO DIFF WBC: CPT

## 2024-01-02 PROCEDURE — 71045 X-RAY EXAM CHEST 1 VIEW: CPT

## 2024-01-02 PROCEDURE — 700102 HCHG RX REV CODE 250 W/ 637 OVERRIDE(OP): Performed by: STUDENT IN AN ORGANIZED HEALTH CARE EDUCATION/TRAINING PROGRAM

## 2024-01-02 PROCEDURE — 84443 ASSAY THYROID STIM HORMONE: CPT

## 2024-01-02 PROCEDURE — 36415 COLL VENOUS BLD VENIPUNCTURE: CPT

## 2024-01-02 PROCEDURE — A9270 NON-COVERED ITEM OR SERVICE: HCPCS | Performed by: STUDENT IN AN ORGANIZED HEALTH CARE EDUCATION/TRAINING PROGRAM

## 2024-01-02 PROCEDURE — 84439 ASSAY OF FREE THYROXINE: CPT

## 2024-01-02 RX ORDER — ACETAMINOPHEN 500 MG
1000 TABLET ORAL ONCE
Status: DISCONTINUED | OUTPATIENT
Start: 2024-01-02 | End: 2024-01-02 | Stop reason: HOSPADM

## 2024-01-02 RX ORDER — CLONIDINE HYDROCHLORIDE 0.1 MG/1
0.1 TABLET ORAL ONCE
Status: COMPLETED | OUTPATIENT
Start: 2024-01-02 | End: 2024-01-02

## 2024-01-02 RX ORDER — SODIUM CHLORIDE 9 MG/ML
1000 INJECTION, SOLUTION INTRAVENOUS ONCE
Status: COMPLETED | OUTPATIENT
Start: 2024-01-02 | End: 2024-01-02

## 2024-01-02 RX ADMIN — SODIUM CHLORIDE 1000 ML: 9 INJECTION, SOLUTION INTRAVENOUS at 05:44

## 2024-01-02 RX ADMIN — CLONIDINE HYDROCHLORIDE 0.1 MG: 0.1 TABLET ORAL at 05:21

## 2024-01-02 ASSESSMENT — FIBROSIS 4 INDEX: FIB4 SCORE: .78125

## 2024-01-02 NOTE — ED NOTES
Pt. Able to fully dress self and ambulate out of ED with steady gait.  Pt. Has all belongings with her upon discharge.  Verbalized understanding of need to f/u with primary care MD.

## 2024-01-02 NOTE — ED NOTES
Received bedside report from ALISON Abdi to assume care of pt.  Pt. Resting on hospital bed.  Call light in reach.  Bed locked in lowest position.  Pt. Denies needs.  Awaiting provider recheck.

## 2024-01-02 NOTE — ED PROVIDER NOTES
CHIEF COMPLAINT  Chief Complaint   Patient presents with    Chest Pain     Left sided Chest pain since Tuesday. Denies pain radiating to other areas. Headache since July. Reports of Grave's disease with thyroid ablation.        LIMITATION TO HISTORY   Select:     DONA Sultana is a 48 y.o. female who presents to the Emergency Department primarily for palpitations and concern regarding her high blood pressure. She also reports some intermittent non-exertional sharp chest pain without dyspnea. She is concerned that she has an adrenal turmor. She reports constant throbbing since July r sided, denies episodes of diaphoresis or random episodes of severe headache. She reports life stress from her family.    OUTSIDE HISTORIAN(S):  Select:    EXTERNAL RECORDS REVIEWED  Select:       PAST MEDICAL HISTORY  Past Medical History:   Diagnosis Date    Hypertension     Thyroid disease      .    SURGICAL HISTORY  Past Surgical History:   Procedure Laterality Date    TUBAL COAGULATION LAPAROSCOPIC BILATERAL           FAMILY HISTORY  Family History   Problem Relation Age of Onset    Thyroid Paternal Aunt         hyperthyroidism    Thyroid Paternal Aunt         hyperthyroidism    Thyroid Maternal Grandmother         goiter          SOCIAL HISTORY  Social History     Socioeconomic History    Marital status:      Spouse name: Not on file    Number of children: Not on file    Years of education: Not on file    Highest education level: Not on file   Occupational History    Not on file   Tobacco Use    Smoking status: Never    Smokeless tobacco: Never   Vaping Use    Vaping Use: Never used   Substance and Sexual Activity    Alcohol use: No    Drug use: Never    Sexual activity: Not on file   Other Topics Concern    Not on file   Social History Narrative    Not on file     Social Determinants of Health     Financial Resource Strain: Not on file   Food Insecurity: Not on file   Transportation Needs: Not on file    Physical Activity: Not on file   Stress: Not on file   Social Connections: Not on file   Intimate Partner Violence: Not on file   Housing Stability: Not on file         CURRENT MEDICATIONS  No current facility-administered medications on file prior to encounter.     Current Outpatient Medications on File Prior to Encounter   Medication Sig Dispense Refill    amoxicillin-clavulanate (AUGMENTIN) 875-125 MG Tab Take 1 Tablet by mouth 2 times a day. 20 Tablet 0    amLODIPine (NORVASC) 5 MG Tab Take 1 Tablet by mouth every day. (Patient not taking: Reported on 11/9/2023) 30 Tablet 0    promethazine-dextromethorphan (PROMETHAZINE-DM) 6.25-15 MG/5ML syrup Take 5 mL by mouth every four hours as needed for Cough. (Patient not taking: Reported on 11/9/2023) 120 mL 0    Omega-3 Fatty Acids (OMEGA 3 PO) Take 1 Tablet by mouth every day. (Patient not taking: Reported on 11/9/2023)      multivitamin (THERAGRAN) Tab Take 1 Tablet by mouth every day. (Patient not taking: Reported on 11/9/2023)      vitamin D3 (CHOLECALCIFEROL) 1000 Unit (25 mcg) Tab Take 1,000 Units by mouth every day. (Patient not taking: Reported on 11/9/2023)      Calcium-Magnesium-Zinc (MYRA-MAG-ZINC PO) Take 1 Tablet by mouth every day. (Patient not taking: Reported on 11/9/2023)      PAPAYA PO Take 1 Tablet by mouth every day. (Patient not taking: Reported on 11/9/2023)      aspirin EC (ECOTRIN) 325 MG Tablet Delayed Response Take 325 mg by mouth one time. (Patient not taking: Reported on 11/9/2023)      carvedilol (COREG) 3.125 MG Tab Take 3.125 mg by mouth 2 times a day as needed. Takes 3.125 mg in the AM daily   Takes 3.125 mg in the PM if needed      liothyronine (CYTOMEL) 5 MCG Tab Take 10 mcg by mouth every day. 2 tablets = 10 mcg      levothyroxine (SYNTHROID) 50 MCG Tab Take  mcg by mouth every morning on an empty stomach. 100 mcg on Monday, Wednesday, and Friday   75 mcg all other days             ALLERGIES  Allergies   Allergen Reactions     "Methimazole Rash     Rash        PHYSICAL EXAM  VITAL SIGNS:/87   Pulse 77   Temp 36.4 °C (97.6 °F) (Temporal)   Resp 12   Ht 1.473 m (4' 10\")   Wt 49.1 kg (108 lb 3.9 oz)   SpO2 94%   BMI 22.62 kg/m²       GENERAL: Awake and alert  HEAD: Normocephalic and atraumatic  NECK: Normal range of motion, without meningismus  EYES: Pupils Equal, Round, Reactive to Light, extraocular movements intact, conjunctiva white  ENT: Mucous membranes moist, oropharynx clear  PULMONARY: Normal effort, clear to auscultation  CARDIOVASCULAR: No murmurs, clicks or rubs, peripheral pulses 2+  ABDOMINAL: Soft, non-tender, no guarding or rigidity present, no pulsatile masses  BACK: no midline tenderness, no costovertebral tenderness  NEUROLOGICAL: Grossly non-focal neurological examination, speech normal, gait normal  EXTREMITIES: No edema, normal to inspection  SKIN: Warm and dry.  PSYCHIATRIC: Affect is appropriate    DIAGNOSTIC STUDIES / PROCEDURES  EKG  I have independently interpreted this EKG  0140 AM  Rate of 91  Sinus rhythm  Left atrial enlargement  Compared to ECG 07/11/2023 12:57:25      LABS  Labs Reviewed   CBC WITH DIFFERENTIAL - Abnormal; Notable for the following components:       Result Value    RBC 5.72 (*)     Hemoglobin 17.3 (*)     Hematocrit 52.5 (*)     MPV 8.9 (*)     Immature Granulocytes 1.70 (*)     Eos (Absolute) 0.52 (*)     Immature Granulocytes (abs) 0.15 (*)     All other components within normal limits    Narrative:     Biotin intake of greater than 5 mg per day may interfere with                  troponin levels, causing false low values.   COMP METABOLIC PANEL - Abnormal; Notable for the following components:    Bun 24 (*)     All other components within normal limits    Narrative:     Biotin intake of greater than 5 mg per day may interfere with                  troponin levels, causing false low values.   TSH WITH REFLEX TO FT4 - Abnormal; Notable for the following components:    TSH 0.010 " (*)     All other components within normal limits    Narrative:     Biotin intake of greater than 5 mg per day may interfere with                  troponin levels, causing false low values.   TROPONIN    Narrative:     Biotin intake of greater than 5 mg per day may interfere with                  troponin levels, causing false low values.   HCG QUAL SERUM    Narrative:     Biotin intake of greater than 5 mg per day may interfere with                  troponin levels, causing false low values.   ESTIMATED GFR    Narrative:     Biotin intake of greater than 5 mg per day may interfere with                  troponin levels, causing false low values.   TROPONIN   FREE THYROXINE    Narrative:     Biotin intake of greater than 5 mg per day may interfere with                  troponin levels, causing false low values.         RADIOLOGY  I independently reviewed and interpreted the images obtained today in the ER. No pneumothorax    Radiologist interpretation:   DX-CHEST-PORTABLE (1 VIEW)   Final Result      No acute cardiopulmonary abnormality.           COURSE & MEDICAL DECISION MAKING    ED COURSE:    ED Observation Status? Yes; I am placing the patient in to an observation status due to a diagnostic uncertainty as well as therapeutic intensity. Patient placed in observation status at 215 AM    Observation plan is as follows: Troponin and repeat troponin will be obtained, given her palpitations tyroid levels will be checked, she is concerned about pheochromocytoma will evaluate obtianing urine/blood testing     INTERVENTIONS BY ME:  Medications   acetaminophen (Tylenol) tablet 1,000 mg (1,000 mg Oral Refused 1/2/24 0521)   cloNIDine (Catapres) tablet 0.1 mg (0.1 mg Oral Given 1/2/24 0521)   NS (Bolus) 0.9 % infusion 1,000 mL (0 mL Intravenous Stopped 1/2/24 0741)       Response on recheck:  430AM patient resting comfortable no chest pain at this time. Normal vital signs. Awaiting repeat troponin.   540 Awaiting T4 level  patient still with reasuring vital signs and no chest pain  730 AM T4 is normal, patient remains well appearing without significant chest pain or severe headache, blood pressure is now normal as well. Discussed work up and outpatient follow up with her PCP/Endocrinology for her concern of pheochromocytoma as testing for this will be deferred given her normal BP and that is is experiencing life stressors.     Patient discharged from ED observation at  732 01/02/24      INITIAL ASSESSMENT, COURSE AND PLAN  Care Narrative:       Patient presenting with palpitations, high blood pressure, headache and atypical chest pain. Her EKG and blood work is reassuring and not consistent with thyroid storm, acute coronary syndrome, sepsis, or significant electrolyte abnormality. Doubt PE as she is 1 year older than [facility] for PE rule out additionally she has no dyspnea, normal vital signs and her chest pain is non pleuritic.Blood pressure came down appropriately after anti-hypertensive's, further work up deferred to PCP/endocrinology if indicated, see no high risk symptoms of pheochromocytoma such as recalcitrant hypertension.    HEART Score for Major Cardiac Events      RESULT SUMMARY:  3 points  Low Score (0-3 points)    Risk of MACE of 0.9-1.7%.    If troponin is positive, many experts recommend further workup and admission even with a low HEART Score.      INPUTS:  History --> 0 = Slightly suspicious  EKG --> 0 = Normal  Age --> 1 = 45-64  Risk factors --> 1 = 1-2 risk factors  Initial troponin --> 1 = 1-3× normal limit       ADDITIONAL PROBLEM LIST    DISPOSITION AND DISCUSSIONS    Discussion of management with other Q or appropriate source(s): None     Escalation of care considered, and ultimately not performed:diagnostic imaging and acute inpatient care management, however at this time, the patient is most appropriate for outpatient management        Decision tools and prescription drugs considered including, but not limited  to: will continue her anti-hypertensives    FINAL DIAGNOSIS  1. Chest pain, unspecified type    2. Hypertension, unspecified type    3. Palpitations             Electronically signed by: Efrain Beltre DO ,8:42 AM 01/02/24

## 2024-01-02 NOTE — ED TRIAGE NOTES
Cheryl Sultana  48 y.o. female  Chief Complaint   Patient presents with    Chest Pain     Left sided Chest pain since Tuesday. Denies pain radiating to other areas. Headache since July. Reports of Grave's disease with thyroid ablation.      Pt ambulatory to triage with steady gait for above complaint.     Pt is GCS 15, speaking in full sentences, follows commands and responds appropriately to questions. Resp are even and unlabored.     Chest pain protocol ordered. Pt placed in phlebotomy. Pt educated on triage process. Pt encouraged to alert staff for any changes.       Vitals:    01/02/24 0134   BP: (!) 190/124   Pulse: 94   Resp: 16   Temp: 36.2 °C (97.1 °F)   SpO2: 97%

## 2024-01-02 NOTE — ED NOTES
Patient ambulatory to RD 12  for triage complaint. Pt placed in gown and placed on monitor. Reports CP feels like a pinching sensation 4/10 pain and neck pain is 7/10. Pt is hypertensive 186/88.

## 2024-04-16 ENCOUNTER — HOSPITAL ENCOUNTER (OUTPATIENT)
Facility: MEDICAL CENTER | Age: 49
End: 2024-04-16
Attending: NURSE PRACTITIONER
Payer: COMMERCIAL

## 2024-04-16 ENCOUNTER — OFFICE VISIT (OUTPATIENT)
Dept: URGENT CARE | Facility: CLINIC | Age: 49
End: 2024-04-16
Payer: COMMERCIAL

## 2024-04-16 VITALS
OXYGEN SATURATION: 99 % | BODY MASS INDEX: 23.72 KG/M2 | TEMPERATURE: 97.7 F | HEART RATE: 80 BPM | WEIGHT: 113 LBS | HEIGHT: 58 IN

## 2024-04-16 DIAGNOSIS — N39.0 ACUTE URINARY TRACT INFECTION: ICD-10-CM

## 2024-04-16 PROBLEM — N83.209 CYST OF OVARY: Status: ACTIVE | Noted: 2022-04-13

## 2024-04-16 PROBLEM — R14.0 BLOATING: Status: ACTIVE | Noted: 2022-05-25

## 2024-04-16 PROBLEM — R31.0 FRANK HEMATURIA: Status: ACTIVE | Noted: 2022-04-13

## 2024-04-16 PROBLEM — K62.5 RECTAL BLEEDING: Status: ACTIVE | Noted: 2022-05-25

## 2024-04-16 LAB
APPEARANCE UR: CLEAR
BILIRUB UR STRIP-MCNC: NEGATIVE MG/DL
COLOR UR AUTO: YELLOW
GLUCOSE UR STRIP.AUTO-MCNC: NEGATIVE MG/DL
KETONES UR STRIP.AUTO-MCNC: NEGATIVE MG/DL
LEUKOCYTE ESTERASE UR QL STRIP.AUTO: NEGATIVE
NITRITE UR QL STRIP.AUTO: NEGATIVE
PH UR STRIP.AUTO: 6.5 [PH] (ref 5–8)
PROT UR QL STRIP: 100 MG/DL
RBC UR QL AUTO: NORMAL
SP GR UR STRIP.AUTO: 1.01
UROBILINOGEN UR STRIP-MCNC: 0.2 MG/DL

## 2024-04-16 PROCEDURE — 81002 URINALYSIS NONAUTO W/O SCOPE: CPT | Performed by: NURSE PRACTITIONER

## 2024-04-16 PROCEDURE — 87086 URINE CULTURE/COLONY COUNT: CPT

## 2024-04-16 PROCEDURE — 99213 OFFICE O/P EST LOW 20 MIN: CPT | Performed by: NURSE PRACTITIONER

## 2024-04-16 RX ORDER — NITROFURANTOIN 25; 75 MG/1; MG/1
100 CAPSULE ORAL 2 TIMES DAILY
Qty: 14 CAPSULE | Refills: 0 | Status: SHIPPED | OUTPATIENT
Start: 2024-04-16 | End: 2024-04-23

## 2024-04-16 RX ORDER — FLUTICASONE PROPIONATE 50 MCG
1 SPRAY, SUSPENSION (ML) NASAL
COMMUNITY

## 2024-04-16 RX ORDER — LOSARTAN POTASSIUM 50 MG/1
50 TABLET ORAL DAILY
COMMUNITY
Start: 2024-03-06

## 2024-04-16 ASSESSMENT — FIBROSIS 4 INDEX: FIB4 SCORE: 0.85

## 2024-04-16 NOTE — PROGRESS NOTES
Chief Complaint   Patient presents with    UTI     Patient states frequent urination and states that she feels pelvic pressure. States that it is worse at night. Patient was on antibiotic last month for a UTI.        HISTORY OF PRESENT ILLNESS: Patient is a pleasant 48 y.o. female who presents today due to three days of urinary burning, urgency, and frequency. Reports some associated pelvic pressure and incontinence. Denies hematuria, fever, flank pain, N/V. Denies a history of kidney stones.  States she had similar symptoms last month, contacted to telemetry doc who placed her on amoxicillin for UTI treatment.  She reports some improvement with return of symptoms this week.    Patient Active Problem List    Diagnosis Date Noted    Bloating 05/25/2022    Rectal bleeding 05/25/2022    Cyst of ovary 04/13/2022    Casey hematuria 04/13/2022    Pneumonia due to COVID-19 virus 09/22/2021    Elevated troponin 09/22/2021    Hypothyroidism following radioiodine therapy 08/10/2015    Hyperthyroidism 03/17/2015       Allergies:Methimazole    Current Outpatient Medications Ordered in Epic   Medication Sig Dispense Refill    fluticasone (FLONASE) 50 MCG/ACT nasal spray Administer 1 Spray into affected nostril(S) every day.      losartan (COZAAR) 50 MG Tab Take 50 mg by mouth every day.      nitrofurantoin (MACROBID) 100 MG Cap Take 1 Capsule by mouth 2 times a day for 7 days. 14 Capsule 0    carvedilol (COREG) 3.125 MG Tab Take 3.125 mg by mouth 2 times a day as needed. Takes 3.125 mg in the AM daily   Takes 3.125 mg in the PM if needed      liothyronine (CYTOMEL) 5 MCG Tab Take 10 mcg by mouth every day. 2 tablets = 10 mcg      levothyroxine (SYNTHROID) 50 MCG Tab Take  mcg by mouth every morning on an empty stomach. 100 mcg on Monday, Wednesday, and Friday   75 mcg all other days       No current [facility]-ordered facility-administered medications on file.       Past Medical History:   Diagnosis Date    Hypertension      "Thyroid disease        Social History     Tobacco Use    Smoking status: Never    Smokeless tobacco: Never   Vaping Use    Vaping Use: Never used   Substance Use Topics    Alcohol use: No    Drug use: Never       Family Status   Relation Name Status    Mo  Alive    Fa  Alive    Sis  Alive    Son  Alive    Son  Alive    Lisa  Alive    PAunt  Alive    PAunt  Alive    MGMo  (Not Specified)     Family History   Problem Relation Age of Onset    Thyroid Paternal Aunt         hyperthyroidism    Thyroid Paternal Aunt         hyperthyroidism    Thyroid Maternal Grandmother         goiter       ROS:  Review of Systems   Constitutional: Negative for fever, chills, weight loss and malaise/fatigue.   HENT: Negative for ear pain, nosebleeds, congestion, sore throat and neck pain.    Eyes: Negative for vision changes.   Neuro: Negative for headache, sensory changes, weakness, seizure, LOC.   Cardiovascular: Negative for chest pain, palpitations, orthopnea and leg swelling.   Respiratory: Negative for cough, sputum production, shortness of breath and wheezing.   Gastrointestinal: Positive for lower abdominal pressure. Negative for abdominal pain, nausea, vomiting or diarrhea.   Genitourinary: Positive for dysuria, urgency and frequency. Negative for hematuria or flank pain.   Skin: Negative for rash, diaphoresis.     Exam:  BP (!) 146/78   Pulse 80   Temp 36.5 °C (97.7 °F) (Temporal)   Ht 1.473 m (4' 10\")   Wt 51.3 kg (113 lb)   SpO2 99%   General: well-nourished, well-developed female in NAD  Head: normocephalic, atraumatic  Eyes: PERRLA, no conjunctival injection, acuity grossly intact, lids normal.  Lymph: no cervical adenopathy. No supraclavicular adenopathy.   Neuro: alert and oriented. Cranial nerves 1-12 grossly intact. No sensory deficit.   Cardiovascular: regular rate and rhythm. No edema.  Pulmonary: no distress. Chest is symmetrical with respiration, no wheezes, crackles, or rhonchi.   Abdomen: soft, non-tender, no " guarding, no hepatosplenomegaly. No CVA tenderness.   Musculoskeletal: no clubbing, appropriate muscle tone, gait is stable.  Skin: warm, dry, intact, no clubbing, no cyanosis, no rashes.   Psych: appropriate mood, affect, judgement.       POC urine positive for blood and protein      Assessment/Plan:  1. Acute urinary tract infection  POCT Urinalysis    nitrofurantoin (MACROBID) 100 MG Cap    URINE CULTURE(NEW)            Patient presents with urinary tract infection, discussed acute versus recurrent.  Antibiotic as directed. Increase fluid intake. Urine sent for culture.   Supportive care, differential diagnoses, and indications for immediate follow-up discussed with patient.   Pathogenesis of diagnosis discussed including typical length and natural progression.   Instructed to return to clinic or nearest emergency department for any change in condition, further concerns, or worsening of symptoms.  Patient states understanding of the plan of care and discharge instructions.  Instructed to make an appointment, for follow up, with her primary care provider.        Please note that this dictation was created using voice recognition software. I have made every reasonable attempt to correct obvious errors, but I expect that there are errors of grammar and possibly content that I did not discover before finalizing the note.      ABIDA Shaw.

## 2024-04-19 LAB
BACTERIA UR CULT: NORMAL
SIGNIFICANT IND 70042: NORMAL
SITE SITE: NORMAL
SOURCE SOURCE: NORMAL

## 2024-04-20 ENCOUNTER — APPOINTMENT (OUTPATIENT)
Dept: RADIOLOGY | Facility: MEDICAL CENTER | Age: 49
End: 2024-04-20
Attending: EMERGENCY MEDICINE
Payer: COMMERCIAL

## 2024-04-20 ENCOUNTER — HOSPITAL ENCOUNTER (EMERGENCY)
Facility: MEDICAL CENTER | Age: 49
End: 2024-04-21
Attending: EMERGENCY MEDICINE
Payer: COMMERCIAL

## 2024-04-20 DIAGNOSIS — R32 URINARY INCONTINENCE, UNSPECIFIED TYPE: ICD-10-CM

## 2024-04-20 DIAGNOSIS — K59.00 CONSTIPATION, UNSPECIFIED CONSTIPATION TYPE: ICD-10-CM

## 2024-04-20 DIAGNOSIS — K64.9 HEMORRHOIDS, UNSPECIFIED HEMORRHOID TYPE: ICD-10-CM

## 2024-04-20 LAB
ALBUMIN SERPL BCP-MCNC: 4 G/DL (ref 3.2–4.9)
ALBUMIN/GLOB SERPL: 1.5 G/DL
ALP SERPL-CCNC: 59 U/L (ref 30–99)
ALT SERPL-CCNC: 15 U/L (ref 2–50)
ANION GAP SERPL CALC-SCNC: 13 MMOL/L (ref 7–16)
APPEARANCE UR: CLEAR
AST SERPL-CCNC: 22 U/L (ref 12–45)
BACTERIA #/AREA URNS HPF: NEGATIVE /HPF
BASOPHILS # BLD AUTO: 0.7 % (ref 0–1.8)
BASOPHILS # BLD: 0.06 K/UL (ref 0–0.12)
BILIRUB SERPL-MCNC: 0.3 MG/DL (ref 0.1–1.5)
BILIRUB UR QL STRIP.AUTO: NEGATIVE
BUN SERPL-MCNC: 22 MG/DL (ref 8–22)
CALCIUM ALBUM COR SERPL-MCNC: 9.2 MG/DL (ref 8.5–10.5)
CALCIUM SERPL-MCNC: 9.2 MG/DL (ref 8.5–10.5)
CHLORIDE SERPL-SCNC: 104 MMOL/L (ref 96–112)
CO2 SERPL-SCNC: 22 MMOL/L (ref 20–33)
COLOR UR: YELLOW
CREAT SERPL-MCNC: 1.14 MG/DL (ref 0.5–1.4)
EOSINOPHIL # BLD AUTO: 0.21 K/UL (ref 0–0.51)
EOSINOPHIL NFR BLD: 2.5 % (ref 0–6.9)
EPI CELLS #/AREA URNS HPF: NEGATIVE /HPF
ERYTHROCYTE [DISTWIDTH] IN BLOOD BY AUTOMATED COUNT: 42.5 FL (ref 35.9–50)
GFR SERPLBLD CREATININE-BSD FMLA CKD-EPI: 58 ML/MIN/1.73 M 2
GLOBULIN SER CALC-MCNC: 2.7 G/DL (ref 1.9–3.5)
GLUCOSE SERPL-MCNC: 114 MG/DL (ref 65–99)
GLUCOSE UR STRIP.AUTO-MCNC: NEGATIVE MG/DL
HCT VFR BLD AUTO: 46.1 % (ref 37–47)
HGB BLD-MCNC: 15.4 G/DL (ref 12–16)
HYALINE CASTS #/AREA URNS LPF: ABNORMAL /LPF
IMM GRANULOCYTES # BLD AUTO: 0.06 K/UL (ref 0–0.11)
IMM GRANULOCYTES NFR BLD AUTO: 0.7 % (ref 0–0.9)
KETONES UR STRIP.AUTO-MCNC: NEGATIVE MG/DL
LEUKOCYTE ESTERASE UR QL STRIP.AUTO: NEGATIVE
LIPASE SERPL-CCNC: 71 U/L (ref 11–82)
LYMPHOCYTES # BLD AUTO: 1.91 K/UL (ref 1–4.8)
LYMPHOCYTES NFR BLD: 22.7 % (ref 22–41)
MCH RBC QN AUTO: 31.4 PG (ref 27–33)
MCHC RBC AUTO-ENTMCNC: 33.4 G/DL (ref 32.2–35.5)
MCV RBC AUTO: 93.9 FL (ref 81.4–97.8)
MICRO URNS: ABNORMAL
MONOCYTES # BLD AUTO: 0.74 K/UL (ref 0–0.85)
MONOCYTES NFR BLD AUTO: 8.8 % (ref 0–13.4)
NEUTROPHILS # BLD AUTO: 5.42 K/UL (ref 1.82–7.42)
NEUTROPHILS NFR BLD: 64.6 % (ref 44–72)
NITRITE UR QL STRIP.AUTO: NEGATIVE
NRBC # BLD AUTO: 0 K/UL
NRBC BLD-RTO: 0 /100 WBC (ref 0–0.2)
PH UR STRIP.AUTO: 6 [PH] (ref 5–8)
PLATELET # BLD AUTO: 264 K/UL (ref 164–446)
PMV BLD AUTO: 8.8 FL (ref 9–12.9)
POTASSIUM SERPL-SCNC: 3.8 MMOL/L (ref 3.6–5.5)
PROT SERPL-MCNC: 6.7 G/DL (ref 6–8.2)
PROT UR QL STRIP: 100 MG/DL
RBC # BLD AUTO: 4.91 M/UL (ref 4.2–5.4)
RBC # URNS HPF: ABNORMAL /HPF
RBC UR QL AUTO: ABNORMAL
SODIUM SERPL-SCNC: 139 MMOL/L (ref 135–145)
SP GR UR STRIP.AUTO: 1.01
UROBILINOGEN UR STRIP.AUTO-MCNC: 0.2 MG/DL
WBC # BLD AUTO: 8.4 K/UL (ref 4.8–10.8)
WBC #/AREA URNS HPF: ABNORMAL /HPF

## 2024-04-20 PROCEDURE — 700105 HCHG RX REV CODE 258: Performed by: EMERGENCY MEDICINE

## 2024-04-20 PROCEDURE — 36415 COLL VENOUS BLD VENIPUNCTURE: CPT

## 2024-04-20 PROCEDURE — 83690 ASSAY OF LIPASE: CPT

## 2024-04-20 PROCEDURE — 81001 URINALYSIS AUTO W/SCOPE: CPT

## 2024-04-20 PROCEDURE — 76857 US EXAM PELVIC LIMITED: CPT

## 2024-04-20 PROCEDURE — 700111 HCHG RX REV CODE 636 W/ 250 OVERRIDE (IP): Performed by: EMERGENCY MEDICINE

## 2024-04-20 PROCEDURE — 80053 COMPREHEN METABOLIC PANEL: CPT

## 2024-04-20 PROCEDURE — 85025 COMPLETE CBC W/AUTO DIFF WBC: CPT

## 2024-04-20 PROCEDURE — 96374 THER/PROPH/DIAG INJ IV PUSH: CPT

## 2024-04-20 PROCEDURE — 99285 EMERGENCY DEPT VISIT HI MDM: CPT

## 2024-04-20 PROCEDURE — 74018 RADEX ABDOMEN 1 VIEW: CPT

## 2024-04-20 RX ORDER — BISACODYL 5 MG
10 TABLET, DELAYED RELEASE (ENTERIC COATED) ORAL ONCE
Status: COMPLETED | OUTPATIENT
Start: 2024-04-20 | End: 2024-04-21

## 2024-04-20 RX ORDER — HYDROCORTISONE ACETATE 25 MG/1
25 SUPPOSITORY RECTAL EVERY 12 HOURS
Qty: 20 SUPPOSITORY | Refills: 0 | Status: SHIPPED | OUTPATIENT
Start: 2024-04-20

## 2024-04-20 RX ORDER — KETOROLAC TROMETHAMINE 15 MG/ML
15 INJECTION, SOLUTION INTRAMUSCULAR; INTRAVENOUS ONCE
Status: COMPLETED | OUTPATIENT
Start: 2024-04-20 | End: 2024-04-20

## 2024-04-20 RX ORDER — HYDROCORTISONE 25 MG/G
CREAM TOPICAL ONCE
Status: COMPLETED | OUTPATIENT
Start: 2024-04-20 | End: 2024-04-21

## 2024-04-20 RX ORDER — SODIUM CHLORIDE 9 MG/ML
1000 INJECTION, SOLUTION INTRAVENOUS ONCE
Status: COMPLETED | OUTPATIENT
Start: 2024-04-20 | End: 2024-04-20

## 2024-04-20 RX ORDER — HYDROCORTISONE ACETATE 25 MG/1
25 SUPPOSITORY RECTAL ONCE
Status: COMPLETED | OUTPATIENT
Start: 2024-04-20 | End: 2024-04-21

## 2024-04-20 RX ADMIN — KETOROLAC TROMETHAMINE 15 MG: 15 INJECTION, SOLUTION INTRAMUSCULAR; INTRAVENOUS at 22:31

## 2024-04-20 RX ADMIN — SODIUM CHLORIDE 1000 ML: 9 INJECTION, SOLUTION INTRAVENOUS at 22:34

## 2024-04-20 ASSESSMENT — FIBROSIS 4 INDEX: FIB4 SCORE: 0.85

## 2024-04-21 ENCOUNTER — APPOINTMENT (OUTPATIENT)
Dept: URGENT CARE | Facility: CLINIC | Age: 49
End: 2024-04-21
Payer: COMMERCIAL

## 2024-04-21 VITALS
SYSTOLIC BLOOD PRESSURE: 167 MMHG | BODY MASS INDEX: 23.42 KG/M2 | DIASTOLIC BLOOD PRESSURE: 95 MMHG | TEMPERATURE: 97.5 F | OXYGEN SATURATION: 94 % | HEIGHT: 58 IN | WEIGHT: 111.55 LBS | HEART RATE: 89 BPM | RESPIRATION RATE: 18 BRPM

## 2024-04-21 PROCEDURE — 700102 HCHG RX REV CODE 250 W/ 637 OVERRIDE(OP): Performed by: EMERGENCY MEDICINE

## 2024-04-21 PROCEDURE — A9270 NON-COVERED ITEM OR SERVICE: HCPCS | Performed by: EMERGENCY MEDICINE

## 2024-04-21 RX ADMIN — HYDROCORTISONE ACETATE 25 MG: 25 SUPPOSITORY RECTAL at 00:18

## 2024-04-21 RX ADMIN — HYDROCORTISONE: 25 CREAM TOPICAL at 00:18

## 2024-04-21 RX ADMIN — BISACODYL 10 MG: 5 TABLET, COATED ORAL at 00:17

## 2024-04-21 NOTE — DISCHARGE INSTRUCTIONS
You need to start drinking more water, high-fiber diet, stool softeners i.e. Colace to soften your stools so that it does not aggravate your hemorrhoids.  I am prescribing you cream and suppositories for your hemorrhoids.  Take laxatives for the next 1 to 2 days until you are completely cleaned out as this may help your urinary pressure and incontinence.  Call [facility] on Tuesday if you do not hear from them by then.

## 2024-04-21 NOTE — ED PROVIDER NOTES
ER Provider Note    Scribed for Dr. Renu Rojas D.O. by Maryam Jimenez. 4/20/2024  9:47 PM    Primary Care Provider: LYNDA Nesbitt    CHIEF COMPLAINT  Chief Complaint   Patient presents with    Pelvic Pain     Tenderness. Since Monday, increased today. Was seen at an urgent care Tuesday and prescribed Nitrofurantoin without relief. Reports urine analysis was negative. Urianry frequency. No burning. Symptoms are increasing, not getting better with antibiotics.     Low Back Pain     Radiating to left flank, and to left buttock.        EXTERNAL RECORDS REVIEWED  Outpatient Notes The patient was seen on 04/16/2024 at Urgent Care for a UTI. The patient was started on nitrofurantoin 100 mg BID.     HPI/ROS  LIMITATION TO HISTORY   Select: : None    Cheryl Sultana is a 48 y.o. female who presents to the ED for evaluation of pelvic pain and low back pain onset 5 days ago. She describes that the pelvic pain has been present since Monday, but notes that it has worsened today. The patient was seen at Urgent Care on the 16th of this month for a urinary tract infection. The patient was complaining of dysuria, urinary urgency and urinary frequency. The patient noted that she had been having similar symptoms for the past month, so she contacted her telemetry doctor, who placed her on amoxicillin. The patient reports that her urine analysis at Urgent Care was negative and she was changed to nitrofurantoin 100 mg twice a day. The patient reports that she continues to have urinary frequency, but denies any dysuria. She states that her symptoms are increasing and notes that the antibiotics are not helping. The patient mentions that she has had urinary incontinence during the night. The patient also notes that the antibiotics are making her feel sick. The patient states she also has low back pain that radiates to her left flank. The patient states that the patient is more to the left buttock  "area. The patient states that her last bowel movement was prior to arrival.     PAST MEDICAL HISTORY  Past Medical History:   Diagnosis Date    Hypertension     Thyroid disease      SURGICAL HISTORY  Past Surgical History:   Procedure Laterality Date    TUBAL COAGULATION LAPAROSCOPIC BILATERAL       FAMILY HISTORY  Family History   Problem Relation Age of Onset    Thyroid Paternal Aunt         hyperthyroidism    Thyroid Paternal Aunt         hyperthyroidism    Thyroid Maternal Grandmother         goiter     SOCIAL HISTORY   reports that she has never smoked. She has never used smokeless tobacco. She reports that she does not drink alcohol and does not use drugs.    CURRENT MEDICATIONS  Previous Medications    CARVEDILOL (COREG) 3.125 MG TAB    Take 3.125 mg by mouth 2 times a day as needed. Takes 3.125 mg in the AM daily   Takes 3.125 mg in the PM if needed    FLUTICASONE (FLONASE) 50 MCG/ACT NASAL SPRAY    Administer 1 Spray into affected nostril(S) every day.    LEVOTHYROXINE (SYNTHROID) 50 MCG TAB    Take  mcg by mouth every morning on an empty stomach. 100 mcg on Monday, Wednesday, and Friday   75 mcg all other days    LIOTHYRONINE (CYTOMEL) 5 MCG TAB    Take 10 mcg by mouth every day. 2 tablets = 10 mcg    LOSARTAN (COZAAR) 50 MG TAB    Take 50 mg by mouth every day.    NITROFURANTOIN (MACROBID) 100 MG CAP    Take 1 Capsule by mouth 2 times a day for 7 days.     ALLERGIES  Methimazole    PHYSICAL EXAM  BP (!) 167/95   Pulse 89   Temp 36.4 °C (97.5 °F) (Temporal)   Resp 18   Ht 1.473 m (4' 10\")   Wt 50.6 kg (111 lb 8.8 oz)   SpO2 94%   BMI 23.31 kg/m²     Constitutional: Patient is well developed, well nourished. Non-toxic appearing. No acute distress.   HENT: Normocephalic, atraumatic.  Moist oral mucosa, nares are patent and clear.  Cardiovascular: Normal heart rate and Regular rhythm. No murmur.  Thorax & Lungs: Clear and equal breath sounds with good excursion. No respiratory distress, no " rhonchi, wheezing or rales.   Abdomen: Mild suprapubic discomfort. Bowel sounds normal in all four quadrants. Soft, no rebound , guarding, palpable masses.   Skin: Warm, Dry, no rashes  Back: No cervical, thoracic, or lumbosacral tenderness. No CVA tenderness. Tenderness on palpation of the lower back and the bilateral SI joint regions.  Extremities: Peripheral pulses 4/4 No edema, No tenderness.    Neurologic: Alert & oriented x 3, Normal motor function, Normal sensory function.   Psychiatric: Affect flat      DIAGNOSTIC STUDIES & PROCEDURES    Labs:   Results for orders placed or performed during the hospital encounter of 04/20/24   CBC with Differential   Result Value Ref Range    WBC 8.4 4.8 - 10.8 K/uL    RBC 4.91 4.20 - 5.40 M/uL    Hemoglobin 15.4 12.0 - 16.0 g/dL    Hematocrit 46.1 37.0 - 47.0 %    MCV 93.9 81.4 - 97.8 fL    MCH 31.4 27.0 - 33.0 pg    MCHC 33.4 32.2 - 35.5 g/dL    RDW 42.5 35.9 - 50.0 fL    Platelet Count 264 164 - 446 K/uL    MPV 8.8 (L) 9.0 - 12.9 fL    Neutrophils-Polys 64.60 44.00 - 72.00 %    Lymphocytes 22.70 22.00 - 41.00 %    Monocytes 8.80 0.00 - 13.40 %    Eosinophils 2.50 0.00 - 6.90 %    Basophils 0.70 0.00 - 1.80 %    Immature Granulocytes 0.70 0.00 - 0.90 %    Nucleated RBC 0.00 0.00 - 0.20 /100 WBC    Neutrophils (Absolute) 5.42 1.82 - 7.42 K/uL    Lymphs (Absolute) 1.91 1.00 - 4.80 K/uL    Monos (Absolute) 0.74 0.00 - 0.85 K/uL    Eos (Absolute) 0.21 0.00 - 0.51 K/uL    Baso (Absolute) 0.06 0.00 - 0.12 K/uL    Immature Granulocytes (abs) 0.06 0.00 - 0.11 K/uL    NRBC (Absolute) 0.00 K/uL   Complete Metabolic Panel   Result Value Ref Range    Sodium 139 135 - 145 mmol/L    Potassium 3.8 3.6 - 5.5 mmol/L    Chloride 104 96 - 112 mmol/L    Co2 22 20 - 33 mmol/L    Anion Gap 13.0 7.0 - 16.0    Glucose 114 (H) 65 - 99 mg/dL    Bun 22 8 - 22 mg/dL    Creatinine 1.14 0.50 - 1.40 mg/dL    Calcium 9.2 8.5 - 10.5 mg/dL    Correct Calcium 9.2 8.5 - 10.5 mg/dL    AST(SGOT) 22 12 - 45 U/L     ALT(SGPT) 15 2 - 50 U/L    Alkaline Phosphatase 59 30 - 99 U/L    Total Bilirubin 0.3 0.1 - 1.5 mg/dL    Albumin 4.0 3.2 - 4.9 g/dL    Total Protein 6.7 6.0 - 8.2 g/dL    Globulin 2.7 1.9 - 3.5 g/dL    A-G Ratio 1.5 g/dL   Lipase   Result Value Ref Range    Lipase 71 11 - 82 U/L   Urinalysis    Specimen: Urine   Result Value Ref Range    Color Yellow     Character Clear     Specific Gravity 1.007 <1.035    Ph 6.0 5.0 - 8.0    Glucose Negative Negative mg/dL    Ketones Negative Negative mg/dL    Protein 100 (A) Negative mg/dL    Bilirubin Negative Negative    Urobilinogen, Urine 0.2 Negative    Nitrite Negative Negative    Leukocyte Esterase Negative Negative    Occult Blood Small (A) Negative    Micro Urine Req Microscopic    URINE MICROSCOPIC (W/UA)   Result Value Ref Range    WBC 0-2 /hpf    RBC 5-10 (A) /hpf    Bacteria Negative None /hpf    Epithelial Cells Negative /hpf    Hyaline Cast 0-2 /lpf   ESTIMATED GFR   Result Value Ref Range    GFR (CKD-EPI) 58 (A) >60 mL/min/1.73 m 2   All labs reviewed by me.      Radiology:   The attending Emergency Physician has independently interpreted the diagnostic imaging associated with this visit and is awaiting the final reading from the radiologist, which will be displayed below.    Preliminary interpretation is a follows: Ultrasound negative  Radiologist interpretation:    US-BLADDER   Final Result      1.  Unremarkable urinary bladder.      [id number] VIEW   Final Result         No specific finding to suggest small bowel obstruction.           COURSE & MEDICAL DECISION MAKING    INITIAL ASSESSMENT AND PLAN  Care Narrative:       9:47 PM - Patient seen and evaluated at bedside. This is a 48 year old woman who presents with pelvic pain and low back pain, mainly to the gluteal area. The patient notes that she also has urinary frequency, urinary urgency and urinary incontinence. The patient has been evaluated for these symptoms, had a negative workup and was placed on  amoxicillin. The patient continued to have symptoms, so she was placed on nitrofurantoin 100 mg BID. Discussed plan of care, including performing lab work and imaging. Patient agrees to plan of care. Patient will be treated with Toradol 15 mg and NS fluids for her symptoms. Ordered UA, Lipase, CMP, CBC w/ Diff., and Dx-Abdomen to evaluate. Differential diagnoses include but are not limited to: Urinary tract infection, pyelonephritis, constipation    Laboratories were all unremarkable including her urine.  Her white count is normal H&H stable BUN and creatinine are normal.  KUB shows large quantity of stool consistent with constipation.    11:23 PM - The patient was reevaluated at bedside. The patient was informed that she is extremely constipated.  Patient was reluctant to acknowledge the diagnosis.  The patient notes that she has hemorrhoids, so having bowel movements is at times difficult for her. She reports that she would like an ultrasound of her pelvic floor. Long discussion was had with the patient in regard to this not being needed at this time. Will order Urine Microscopic an US-Bladder. The patient will also be treated wit Perianal 2.5% cream. Anusol-HC 25 mg and Dulcolax 10 mg.  She is to increase the water and fiber in her diet, laxatives for the next several days, referral for urology was placed at the patient's request but she is still to follow-up with her primary care provider on Monday for further evaluation.    12:27 AM - The patient was reevaluated at bedside. The patient's ultrasound and lab workup was reassuring. I recommended that the patient dink more water, consume more fiber and take stool softeners, such as Colace. She will be discharged home with Anusol-HC 25 mg for at home management of her constipation. The patient also requested a referral to urology, so this will be made. Discussed discharge instructions and return precautions with the patient and they were cleared for discharge.  Patient was given the opportunity to ask any further questions. She is comfortable with discharge at this time.       HYDRATION: Based on the patient's presentation of Dehydration the patient was given IV fluids. IV Hydration was used because oral hydration was not adequate alone. Upon recheck following hydration, the patient was improved.                   DISPOSITION AND DISCUSSIONS  I have discussed management of the patient with the following physicians and NOELLE's: None.    Discussion of management with other [facility] or appropriate source(s): None     Barriers to care at this time, including but not limited to:  None known .     Decision tools and prescription drugs considered including, but not limited to: Anusol HC suppositories.    The patient will return for new or worsening symptoms and is stable at the time of discharge.    DISPOSITION:  Patient will be discharged home in stable condition.    FOLLOW UP:  LYNAD Nesbitt  [address] [facility] [id number] [phone]    Schedule an appointment as soon as possible for a visit in 2 days      [facility] [address]  [facility] [address] [phone]  Call in 2 days      OUTPATIENT MEDICATIONS:  Discharge Medication List as of 4/21/2024 12:23 AM        START taking these medications    Details   hydrocortisone (ANUSOL-HC) 25 MG Suppos Insert 1 Suppository into the rectum every 12 hours., Disp-20 Suppository, R-0, Normal            FINAL IMPRESSION   1. Urinary incontinence, unspecified type    2. Constipation, unspecified constipation type    3. Hemorrhoids, unspecified hemorrhoid type      I, Maryam Jimenez (Scribcristina), am scribing for, and in the presence of, Renu Rojas D.O..    Electronically signed by: Maryam Jimenez (Gin), 4/20/2024    IRenu D.O. personally performed the services described in this documentation, as scribed by Maryam Jimenez in my presence, and it  is both accurate and complete.    The note accurately reflects work and decisions made by me.  Renu Rojas D.O.  4/21/2024  3:29 AM

## 2024-04-21 NOTE — ED TRIAGE NOTES
Chief Complaint   Patient presents with    Pelvic Pain     Tenderness. Since Monday, increased today. Was seen at an urgent care Tuesday and prescribed Nitrofurantoin without relief. Reports urine analysis was negative. Urianry frequency. No burning. Symptoms are increasing, not getting better with antibiotics.     Low Back Pain     Radiating to left flank, and to left buttock.      Pt ambulates from lobby with steady gait. Skin signs normal, warm, dry. Pt speaking full sentences, A & O x 4. Respirations equal and unlabored. Pt educated on triage process and to alert staff of any changing conditions. Pt returned to the lobby no apparent distress. Protocol ordered. Pt provided with urine cup and ambulates to restroom.

## 2024-05-15 ENCOUNTER — OFFICE VISIT (OUTPATIENT)
Dept: UROLOGY | Facility: MEDICAL CENTER | Age: 49
End: 2024-05-15
Payer: COMMERCIAL

## 2024-05-15 ENCOUNTER — HOSPITAL ENCOUNTER (OUTPATIENT)
Facility: MEDICAL CENTER | Age: 49
End: 2024-05-15
Attending: STUDENT IN AN ORGANIZED HEALTH CARE EDUCATION/TRAINING PROGRAM
Payer: COMMERCIAL

## 2024-05-15 VITALS
HEIGHT: 58 IN | SYSTOLIC BLOOD PRESSURE: 132 MMHG | BODY MASS INDEX: 22.88 KG/M2 | OXYGEN SATURATION: 97 % | DIASTOLIC BLOOD PRESSURE: 92 MMHG | WEIGHT: 109 LBS | TEMPERATURE: 97.8 F | HEART RATE: 96 BPM

## 2024-05-15 DIAGNOSIS — R10.2 PELVIC PAIN: ICD-10-CM

## 2024-05-15 DIAGNOSIS — R32 URINARY INCONTINENCE, UNSPECIFIED TYPE: ICD-10-CM

## 2024-05-15 DIAGNOSIS — N32.81 OVERACTIVE BLADDER: ICD-10-CM

## 2024-05-15 LAB
POC POST-VOID: NORMAL
POC PRE-VOID: 106 ML

## 2024-05-15 PROCEDURE — 51798 US URINE CAPACITY MEASURE: CPT | Performed by: STUDENT IN AN ORGANIZED HEALTH CARE EDUCATION/TRAINING PROGRAM

## 2024-05-15 PROCEDURE — 99204 OFFICE O/P NEW MOD 45 MIN: CPT | Performed by: STUDENT IN AN ORGANIZED HEALTH CARE EDUCATION/TRAINING PROGRAM

## 2024-05-15 PROCEDURE — 3075F SYST BP GE 130 - 139MM HG: CPT | Performed by: STUDENT IN AN ORGANIZED HEALTH CARE EDUCATION/TRAINING PROGRAM

## 2024-05-15 PROCEDURE — 3080F DIAST BP >= 90 MM HG: CPT | Performed by: STUDENT IN AN ORGANIZED HEALTH CARE EDUCATION/TRAINING PROGRAM

## 2024-05-15 RX ORDER — FESOTERODINE FUMARATE 4 MG/1
4 TABLET, FILM COATED, EXTENDED RELEASE ORAL DAILY
Qty: 30 TABLET | Refills: 6 | Status: SHIPPED | OUTPATIENT
Start: 2024-05-15 | End: 2024-12-11

## 2024-05-15 ASSESSMENT — FIBROSIS 4 INDEX: FIB4 SCORE: 1.08

## 2024-05-15 NOTE — PROGRESS NOTES
Subjective  Cheryl Sultana is a 48 y.o. female who presents today for evaluation of nocturia and pelvic pain.    Starting in March she was having increased frequency, urgency, nocturia, and pelvic pain which has improved slightly since discontinuing a vitamin D supplement. She was checked for infection which was negative back in April. She says when she lays down she notices the pelvic pain the most. She wakes up every 2 hours overnight with the urge to urinate but only a small volume comes out. Typically during the day she will urinate every 3-4 hours.     She has a history of persistent microscopic hematuria which has been worked up with no significant findings. She his not having any yumiko-menopausal symptoms. No hematuria. She has some dyspareunia and worsening pelvic pain after sexual activity.    She has two bowel movements per day which are soft.     Family History   Problem Relation Age of Onset    Thyroid Paternal Aunt         hyperthyroidism    Thyroid Paternal Aunt         hyperthyroidism    Thyroid Maternal Grandmother         goiter       Social History     Socioeconomic History    Marital status:      Spouse name: Not on file    Number of children: Not on file    Years of education: Not on file    Highest education level: Not on file   Occupational History    Not on file   Tobacco Use    Smoking status: Never    Smokeless tobacco: Never   Vaping Use    Vaping status: Never Used   Substance and Sexual Activity    Alcohol use: No    Drug use: Never    Sexual activity: Not on file   Other Topics Concern    Not on file   Social History Narrative    Not on file     Social Determinants of Health     Financial Resource Strain: Not on file   Food Insecurity: Not on file   Transportation Needs: Not on file   Physical Activity: Not on file   Stress: Not on file   Social Connections: Not on file   Intimate Partner Violence: Not on file   Housing Stability: Not on file       Past Surgical  "History:   Procedure Laterality Date    TUBAL COAGULATION LAPAROSCOPIC BILATERAL         Past Medical History:   Diagnosis Date    Hypertension     Thyroid disease        Current Outpatient Medications   Medication Sig Dispense Refill    fesoterodine fumarate (TOVIAZ) 4 MG TABLET SR 24 HR Take 1 Tablet by mouth every day for 210 days. 30 Tablet 6    fluticasone (FLONASE) 50 MCG/ACT nasal spray Administer 1 Spray into affected nostril(S) every day.      losartan (COZAAR) 50 MG Tab Take 50 mg by mouth every day.      carvedilol (COREG) 3.125 MG Tab Take 3.125 mg by mouth 2 times a day as needed. Takes 3.125 mg in the AM daily   Takes 3.125 mg in the PM if needed      liothyronine (CYTOMEL) 5 MCG Tab Take 10 mcg by mouth every day. 2 tablets = 10 mcg      levothyroxine (SYNTHROID) 50 MCG Tab Take  mcg by mouth every morning on an empty stomach. 100 mcg on Monday, Wednesday, and Friday   75 mcg all other days      hydrocortisone (ANUSOL-HC) 25 MG Suppos Insert 1 Suppository into the rectum every 12 hours. (Patient not taking: Reported on 5/15/2024) 20 Suppository 0     No current facility-administered medications for this visit.       Allergies   Allergen Reactions    Methimazole Rash     Rash        Objective  BP (!) 132/92 (BP Location: Right arm, Patient Position: Sitting, BP Cuff Size: Adult)   Pulse 96   Temp 36.6 °C (97.8 °F) (Temporal)   Ht 1.473 m (4' 10\")   Wt 49.4 kg (109 lb)   SpO2 97%   Physical Exam  HENT:      Head: Normocephalic and atraumatic.   Pulmonary:      Effort: Pulmonary effort is normal.   Abdominal:      General: Abdomen is flat. There is no distension.      Palpations: Abdomen is soft.      Tenderness: There is no abdominal tenderness. There is no right CVA tenderness or left CVA tenderness.   Skin:     General: Skin is warm and dry.   Neurological:      General: No focal deficit present.   Psychiatric:         Mood and Affect: Mood normal.         Behavior: Behavior normal. "         Labs:   POCT UA   Lab Results   Component Value Date/Time    POCCOLOR yellow 04/16/2024 09:08 AM    POCAPPEAR clear 04/16/2024 09:08 AM    POCLEUKEST negative 04/16/2024 09:08 AM    POCNITRITE negative 04/16/2024 09:08 AM    POCUROBILIGE 0.2 04/16/2024 09:08 AM    POCPROTEIN 100 04/16/2024 09:08 AM    POCURPH 6.5 04/16/2024 09:08 AM    POCBLOOD moderate 04/16/2024 09:08 AM    POCSPGRV 1.010 04/16/2024 09:08 AM    POCKETONES negative 04/16/2024 09:08 AM    POCBILIRUBIN negative 04/16/2024 09:08 AM    POCGLUCUA negative 04/16/2024 09:08 AM      BMP   Lab Results   Component Value Date/Time    SODIUM 139 04/20/2024 2226    POTASSIUM 3.8 04/20/2024 2226    CHLORIDE 104 04/20/2024 2226    CO2 22 04/20/2024 2226    GLUCOSE 114 (H) 04/20/2024 2226    BUN 22 04/20/2024 2226    CREATININE 1.14 04/20/2024 2226    CALCIUM 9.2 04/20/2024 2226         Imaging: none    Assessment    We discussed that pelvic pain can be related to urinary tract infection, constipation, vaginal atrophy/ syndrome of menopause, bladder pain syndrome/Interstitial cystitis, and pelvic floor dysfunction.  I believe the origin of her pain is likely pelvic floor dysfunction, however, we will check for a infection with ureaplasma/mycoplasma which do not grow on a standard culture. These can also cause overactivity of the bladder and urgency/nocturia.     We reviewed the lifestyle modifications recommended for pelvic pain/bladder pain including stress management, avoidance of bladder irritants (such as caffeinated beverages, spicy food, citrus, tomatoes, etc), and management of constipation to ensure one soft bowel movement per day. We discussed there are various therapies such as: OTC medications, prescription medications, bladder instillations, pelvic floor physical therapy, as well as procedures (cystosoopy with hydrodistention, sacral neuromodulation and chemodenvervation of the bladder with botox injections) to alleviate her symptoms. I  recommend pelvic floor physical therapy, TENS machine, and toviaz. She would like to proceed with pelvic floor physical therapy and TENS machine, and will keep the Toviaz on hand to try if her symptoms worsen.       Plan    Problem List Items Addressed This Visit    None  Visit Diagnoses       Urinary incontinence, unspecified type        Relevant Medications    fesoterodine fumarate (TOVIAZ) 4 MG TABLET SR 24 HR    Other Relevant Orders    POCT Bladder Scan (Completed)    Pelvic pain        Relevant Orders    Referral to Physical Therapy    UROGENITAL UREAPLASMA/MYCOPLASMA, PCR    Overactive bladder        Relevant Orders    Referral to Physical Therapy        RTC 3 months  Referral to pelvic floor physical therapy  Check ureaplasma/mycoplasma  Start transcutaneous tibial nerve stimulation  Toviaz prescription given

## 2024-05-15 NOTE — PATIENT INSTRUCTIONS
Transcutaneous Tibial Nerve Stimulation     What is Transcutaneous Tibial Nerve Stimulation (TTNS)?     Transcutaneous Tibial Nerve Stimulation (TTNS) is a non-invasive therapy used to treat various medical conditions, including overactive bladder, urinary urgency, and some types of chronic pain. It involves the use of a TENS (Transcutaneous Electrical Nerve Stimulation) machine to stimulate the tibial nerve, which can help alleviate symptoms and improve muscle function.     Before You Begin:     Before starting TTNS therapy with a TENS machine, please ensure the following:     Consultation: You should have discussed this treatment with your healthcare provider, who will determine if TTNS is suitable for your condition.   TENS Machine: Ensure you have a TENS machine specifically designed for TTNS. Your healthcare provider or a medical supply store can help you acquire the appropriate device. We recommend the TENS [email or web address] Digital TENS Unit with accessories (can be ordered from [employer]) or a similar device.   Clean Hands: Always wash your hands thoroughly before handling the TENS machine or its electrodes.   Using Your TENS Machine for TTNS:     Follow these steps to use your TENS machine for TTNS:     Prepare the TENS Machine:     a. Make sure the TENS machine is turned off before attaching electrodes.   b. Ensure the device is clean and in good working condition.     Electrode Placement:     a. Your healthcare provider will guide you on the precise placement of the electrodes. Typically, the first lead is placed between the medial malleolus (ankle bone on the inside of your leg) and your heel, and the second pad is placed 2 pad widths up behind your tibia (place directly above first pad). See diagram below.   b. Ensure your skin is clean and dry before attaching the electrodes.   c. Connect the electrodes to the TENS machine following the 's instructions.         Turn On the TENS Machine:     a. Start with  the TENS machine turned off.   b. Gradually increase the intensity to a comfortable level, as directed by your healthcare provider.  Pulse width setting of 200 ls, intensity of 22-28 mA, and a frequency of 10 Hz. You should feel a gentle, tingling sensation but no discomfort or pain.     Treatment Duration:     a. The duration and frequency of TTNS recommended is 30 minutes, once a week for 12 weeks. You can then transition to a maintenance schedule of once a month for 30 minutes. You can perform this more often if needed (as frequently as daily).     During Treatment:     a. Remain still and relaxed during the TTNS session.   b. You may read, watch TV, or engage in a quiet activity while using the TENS machine.     After Treatment:     a. Turn off the TENS machine when the session is complete.   b. Carefully remove the electrodes and clean the skin if needed.   c. Store the TENS machine and electrodes in a safe, dry place.     Important Considerations:     Safety: Do not use your TENS machine while bathing or showering.   Skin Sensitivity: If you experience skin irritation or discomfort, discontinue use and contact your healthcare provider.   Medication: Continue any medications as prescribed by your healthcare provider unless instructed otherwise.   Maintenance: Regularly check your TENS machine and electrodes for wear and tear. Replace them as needed.   Follow-Up: Keep all scheduled follow-up appointments with your healthcare provider to assess your progress and make any necessary adjustments to your treatment plan.     If you have any questions or concerns about your TTNS therapy or experience any unusual symptoms, contact your healthcare provider promptly.

## 2024-05-18 LAB
M GENITALIUM DNA SPEC QL NAA+PROBE: NOT DETECTED
M HOMINIS DNA SPEC QL NAA+PROBE: NOT DETECTED
SPECIMEN SOURCE: NORMAL
U PARVUM DNA SPEC QL NAA+PROBE: NOT DETECTED
U UREALYTICUM DNA SPEC QL NAA+PROBE: NOT DETECTED

## 2024-08-06 ENCOUNTER — HOSPITAL ENCOUNTER (OUTPATIENT)
Dept: RADIOLOGY | Facility: MEDICAL CENTER | Age: 49
End: 2024-08-06
Attending: NURSE PRACTITIONER
Payer: COMMERCIAL

## 2024-08-06 DIAGNOSIS — R10.2 PELVIC PAIN: ICD-10-CM

## 2025-05-04 ENCOUNTER — OFFICE VISIT (OUTPATIENT)
Dept: URGENT CARE | Facility: CLINIC | Age: 50
End: 2025-05-04
Payer: COMMERCIAL

## 2025-05-04 VITALS
TEMPERATURE: 98.3 F | RESPIRATION RATE: 20 BRPM | WEIGHT: 113.5 LBS | OXYGEN SATURATION: 98 % | HEART RATE: 102 BPM | DIASTOLIC BLOOD PRESSURE: 60 MMHG | BODY MASS INDEX: 23.82 KG/M2 | HEIGHT: 58 IN | SYSTOLIC BLOOD PRESSURE: 110 MMHG

## 2025-05-04 DIAGNOSIS — B96.89 ACUTE BACTERIAL SINUSITIS: ICD-10-CM

## 2025-05-04 DIAGNOSIS — J02.8 ACUTE BACTERIAL PHARYNGITIS: ICD-10-CM

## 2025-05-04 DIAGNOSIS — B96.89 ACUTE BACTERIAL PHARYNGITIS: ICD-10-CM

## 2025-05-04 DIAGNOSIS — J01.90 ACUTE BACTERIAL SINUSITIS: ICD-10-CM

## 2025-05-04 PROCEDURE — 3074F SYST BP LT 130 MM HG: CPT

## 2025-05-04 PROCEDURE — 3078F DIAST BP <80 MM HG: CPT

## 2025-05-04 PROCEDURE — 99214 OFFICE O/P EST MOD 30 MIN: CPT

## 2025-05-04 RX ORDER — PREDNISONE 20 MG/1
40 TABLET ORAL DAILY
Qty: 8 TABLET | Refills: 0 | Status: SHIPPED | OUTPATIENT
Start: 2025-05-04 | End: 2025-05-08

## 2025-05-04 RX ORDER — PSEUDOEPHEDRINE HCL 30 MG/1
30-60 TABLET, FILM COATED ORAL EVERY 6 HOURS PRN
Qty: 20 TABLET | Refills: 0 | Status: SHIPPED | OUTPATIENT
Start: 2025-05-04 | End: 2025-05-16

## 2025-05-04 RX ORDER — HYDROXYZINE HYDROCHLORIDE 10 MG/1
10 TABLET, FILM COATED ORAL EVERY 8 HOURS PRN
COMMUNITY

## 2025-05-04 ASSESSMENT — FIBROSIS 4 INDEX: FIB4 SCORE: 1.097345281425434784

## 2025-05-04 NOTE — PROGRESS NOTES
Subjective:   Cheryl Sultana is a 49 y.o. female who presents for Sinusitis (Patient is here today for a sinus infection. Patient also states a sore throat. Patient states pain to her face and states congestion.)    Patient presents to the clinic for complaints of sore throat and facial sinus pain x 6 days.  Sore throat has been worsening as time goes on.  Patient is feeling sinus pain in her cheeks and her forehead.  Pain has also been worsening as time goes on, making it hard for her to sleep at night.  Has taken OTC cough and cold medications, which have not been helpful.  Does have some intermittent chills as well.  Patient denies chest pain, SOB, dizziness/lightheadedness/vertigo, nausea/vomiting/diarrhea, difficulty breathing or swallowing, palpitations or racing heart rate, fever, weakness, or ear pain.      Sinusitis        ROS  Refer to HPI for additional details.    During this visit, appropriate PPE was worn, and hand hygiene was performed.    PMH:  has a past medical history of Hypertension and Thyroid disease.    MEDS:   Current Outpatient Medications:     hydrOXYzine HCl (ATARAX) 10 MG Tab, Take 10 mg by mouth every 8 hours as needed., Disp: , Rfl:     amoxicillin-clavulanate (AUGMENTIN) 875-125 MG Tab, Take 1 Tablet by mouth 2 times a day for 7 days., Disp: 14 Tablet, Rfl: 0    pseudoephedrine (SUDAFED) 30 MG Tab, Take 1-2 Tablets by mouth every 6 hours as needed for Congestion., Disp: 20 Tablet, Rfl: 0    predniSONE (DELTASONE) 20 MG Tab, Take 2 Tablets by mouth every day for 4 days., Disp: 8 Tablet, Rfl: 0    losartan (COZAAR) 50 MG Tab, Take 50 mg by mouth every day., Disp: , Rfl:     carvedilol (COREG) 3.125 MG Tab, Take 3.125 mg by mouth 2 times a day as needed. Takes 3.125 mg in the AM daily  Takes 3.125 mg in the PM if needed, Disp: , Rfl:     liothyronine (CYTOMEL) 5 MCG Tab, Take 10 mcg by mouth every day. 2 tablets = 10 mcg, Disp: , Rfl:     levothyroxine (SYNTHROID) 50 MCG Tab,  "Take  mcg by mouth every morning on an empty stomach. 100 mcg on Monday, Wednesday, and Friday  75 mcg all other days, Disp: , Rfl:     hydrocortisone (ANUSOL-HC) 25 MG Suppos, Insert 1 Suppository into the rectum every 12 hours. (Patient not taking: Reported on 5/15/2024), Disp: 20 Suppository, Rfl: 0    fluticasone (FLONASE) 50 MCG/ACT nasal spray, Administer 1 Spray into affected nostril(S) every day., Disp: , Rfl:     ALLERGIES:   Allergies   Allergen Reactions    Methimazole Rash     Rash      SURGHX:   Past Surgical History:   Procedure Laterality Date    TUBAL COAGULATION LAPAROSCOPIC BILATERAL       SOCHX:  reports that she has never smoked. She has never used smokeless tobacco. She reports that she does not drink alcohol and does not use drugs.    FH: Per HPI as applicable/pertinent.    Medications, Allergies, and current problem list reviewed today in Epic.     Objective:     /60   Pulse (!) 102   Temp 36.8 °C (98.3 °F) (Temporal)   Resp 20   Ht 1.473 m (4' 10\")   Wt 51.5 kg (113 lb 8 oz)   SpO2 98%     Physical Exam  Constitutional:       Appearance: She is ill-appearing. She is not toxic-appearing.   HENT:      Head: Normocephalic.      Right Ear: Tympanic membrane, ear canal and external ear normal.      Left Ear: Tympanic membrane, ear canal and external ear normal.      Nose: Congestion present. No rhinorrhea.      Comments: Facial sinus pressure to the forehead and bilateral cheek areas.     Mouth/Throat:      Mouth: Mucous membranes are moist.      Pharynx: Oropharynx is clear. Posterior oropharyngeal erythema present. No oropharyngeal exudate.   Eyes:      General:         Right eye: No discharge.         Left eye: No discharge.      Extraocular Movements: Extraocular movements intact.      Conjunctiva/sclera: Conjunctivae normal.   Cardiovascular:      Rate and Rhythm: Normal rate and regular rhythm.      Pulses: Normal pulses.      Heart sounds: Normal heart sounds. No murmur " heard.  Pulmonary:      Effort: Pulmonary effort is normal. No respiratory distress.      Breath sounds: Normal breath sounds. No wheezing, rhonchi or rales.   Abdominal:      General: Abdomen is flat.   Musculoskeletal:         General: No signs of injury. Normal range of motion.      Cervical back: Normal range of motion. No rigidity.   Lymphadenopathy:      Cervical: No cervical adenopathy.   Skin:     General: Skin is warm and dry.   Neurological:      General: No focal deficit present.      Mental Status: She is alert and oriented to person, place, and time.      Motor: No weakness.   Psychiatric:         Mood and Affect: Mood normal.         Behavior: Behavior normal.         Thought Content: Thought content normal.         Judgment: Judgment normal.         Assessment/Plan:     Diagnosis and associated orders:     1. Acute bacterial sinusitis  - amoxicillin-clavulanate (AUGMENTIN) 875-125 MG Tab; Take 1 Tablet by mouth 2 times a day for 7 days.  Dispense: 14 Tablet; Refill: 0  - pseudoephedrine (SUDAFED) 30 MG Tab; Take 1-2 Tablets by mouth every 6 hours as needed for Congestion.  Dispense: 20 Tablet; Refill: 0  - predniSONE (DELTASONE) 20 MG Tab; Take 2 Tablets by mouth every day for 4 days.  Dispense: 8 Tablet; Refill: 0    2. Acute bacterial pharyngitis  - amoxicillin-clavulanate (AUGMENTIN) 875-125 MG Tab; Take 1 Tablet by mouth 2 times a day for 7 days.  Dispense: 14 Tablet; Refill: 0  - predniSONE (DELTASONE) 20 MG Tab; Take 2 Tablets by mouth every day for 4 days.  Dispense: 8 Tablet; Refill: 0    Other orders  - hydrOXYzine HCl (ATARAX) 10 MG Tab; Take 10 mg by mouth every 8 hours as needed.     Comments/MDM:     Discussed that likely etiology of the patient's symptoms is acute bacterial sinusitis and bacterial pharyngitis.  POC strep test deferred due to strep coccal coverage with her prescribed antibiotic.  Augmentin 1 week course prescribed to the patient as well as prednisone 5-day course and  Sudafed as needed. Discussed proper administration and dosing as well as associated adverse/side effects of prescribed medications.  Advised patient to continue OTC pharmacologic and nonpharmacologic treatment of her symptoms, including but not limited to Tylenol, Motrin, warm salt water gargles, Chloraseptic spray, decongestants, and plenty of rest and fluids.  Patient is exhibiting systemic symptoms, including elevated heart rate of 102 bpm on physical exam. Symptoms are likely contributed due to acute bacterial sinusitis.  Patient agreeable to this plan of care.  All questions answered.  Return to clinic if symptoms persist or worsen.  Red flag symptoms warranting emergency medical services discussed, including but not limited to chest pain, SOB, wheezing, difficulty breathing or swallowing, sensation of throat closing or mass in the throat, severe intractable headache, changes to vision or hearing, palpitations or racing heart rate, abdominal pain, fever greater than 103F despite medication management, dizziness/lightheadedness/vertigo, or nausea/vomiting/diarrhea.           Differential diagnosis, natural history, supportive care, and indications for immediate follow-up discussed.    Advised the patient to follow-up with the primary care physician for recheck, reevaluation, and consideration of further management.    Instructed patient to seek emergency medical attention via calling EMS or going to the Emergency Room for red flag symptoms, including but not limited to: chest pain, palpitations, fever greater than 103F, shortness of breath, wheezing, new or worsened numbness/tingling, focal or unilateral weakness, and bloody vomit/stool.     Please note that this dictation was created using voice recognition software. I have made a reasonable attempt to correct obvious errors, but I expect that there are errors of grammar and possibly content that I did not discover before finalizing the note.    This note was  electronically signed by MARILEE Luna

## 2025-05-09 ENCOUNTER — TELEPHONE (OUTPATIENT)
Dept: CARDIOLOGY | Facility: MEDICAL CENTER | Age: 50
End: 2025-05-09
Payer: COMMERCIAL

## 2025-05-09 NOTE — TELEPHONE ENCOUNTER
Called patient in regards to records for NP appointment with Dr. Spence. To review most recent lab results, ekg, any cardiac testing or ,if patient has been treated by a cardiologist. Answer machine saying phone is not in service. Called Duane His son and states she was in [place].

## 2025-05-16 ENCOUNTER — OFFICE VISIT (OUTPATIENT)
Dept: CARDIOLOGY | Facility: MEDICAL CENTER | Age: 50
End: 2025-05-16
Attending: INTERNAL MEDICINE
Payer: COMMERCIAL

## 2025-05-16 VITALS
DIASTOLIC BLOOD PRESSURE: 60 MMHG | OXYGEN SATURATION: 98 % | BODY MASS INDEX: 23.3 KG/M2 | HEIGHT: 58 IN | HEART RATE: 80 BPM | SYSTOLIC BLOOD PRESSURE: 108 MMHG | RESPIRATION RATE: 16 BRPM | WEIGHT: 111 LBS

## 2025-05-16 DIAGNOSIS — R00.2 PALPITATIONS: ICD-10-CM

## 2025-05-16 DIAGNOSIS — R06.09 DYSPNEA ON EXERTION: Primary | ICD-10-CM

## 2025-05-16 DIAGNOSIS — E78.5 DYSLIPIDEMIA: ICD-10-CM

## 2025-05-16 DIAGNOSIS — I10 ESSENTIAL HYPERTENSION: ICD-10-CM

## 2025-05-16 PROCEDURE — 93005 ELECTROCARDIOGRAM TRACING: CPT | Mod: TC | Performed by: INTERNAL MEDICINE

## 2025-05-16 PROCEDURE — 99213 OFFICE O/P EST LOW 20 MIN: CPT | Performed by: INTERNAL MEDICINE

## 2025-05-16 PROCEDURE — 99204 OFFICE O/P NEW MOD 45 MIN: CPT | Performed by: INTERNAL MEDICINE

## 2025-05-16 RX ORDER — ROSUVASTATIN CALCIUM 5 MG/1
5 TABLET, COATED ORAL EVERY EVENING
COMMUNITY

## 2025-05-16 ASSESSMENT — FIBROSIS 4 INDEX: FIB4 SCORE: 1.097345281425434784

## 2025-05-16 NOTE — ASSESSMENT & PLAN NOTE
Blood pressures currently well-controlled on her current regimen she will continue ongoing losartan.   Surgeon: Deo Keys MD

## 2025-05-16 NOTE — ASSESSMENT & PLAN NOTE
Clinically, she is doing well and I suspect that her palpitations are multifactorial.  At this time I have recommended that she undergo a Zio patch monitor to assess for any type of arrhythmia given current thyroid supplementation with fluctuations in her dosing regimen.  Will make formal recommendations pending her overall evaluation

## 2025-05-16 NOTE — ASSESSMENT & PLAN NOTE
At this time she will continue with a naturopathic approach to her cholesterol utilizing sterol  based gummies.

## 2025-05-16 NOTE — PROGRESS NOTES
[facility]    PatientName:Cheryl SilvaasDate: 2025  :1975    49 y.o.PCP:LYNDA Nesbitt  MRN:[id number]          Problems and Plans    Palpitations  Clinically, she is doing well and I suspect that her palpitations are multifactorial.  At this time I have recommended that she undergo a Zio patch monitor to assess for any type of arrhythmia given current thyroid supplementation with fluctuations in her dosing regimen.  Will make formal recommendations pending her overall evaluation    Essential hypertension  Blood pressures currently well-controlled on her current regimen she will continue ongoing losartan.    Dyslipidemia  At this time she will continue with a naturopathic approach to her cholesterol utilizing sterol  based gummies.    Return if symptoms worsen or fail to improve.      Encounter    Reason for Visit / Chief Complaint: Palpitations    HPI    49-year-old female with known history of Graves' disease, dyslipidemia presents in consultation for evaluation of palpitations.    She was in her usual state of health and travel to [place] approximately 1 month ago and had a pinching like sensation in the left chest wall which she initially thought was attributed to her thyroid in which she had her thyroid medications adjusted for due to her Graves' disease status postradiation back in 2017.  She notes while over in [place] she was able to walk around and did not have significant cardiovascular complaints except that she did experience palpitations predominantly at night when she was resting quietly.  She does not have any other associated symptoms.  Her other clinical concern is that she had her cholesterol checked by her primary care provider and ultimately started taking plant sterols based gummy with improvement in her cholesterol.  She prefers to focus and on her cholesterol management from a naturopathic approach.  Past Medical  "History  Past Medical History[1]  Past Surgical History  Past Surgical History[2]  Social History  Social History     Socioeconomic History    Marital status:      Spouse name: Not on file    Number of children: Not on file    Years of education: Not on file    Highest education level: Not on file   Occupational History    Not on file   Tobacco Use    Smoking status: Never    Smokeless tobacco: Never   Vaping Use    Vaping status: Never Used   Substance and Sexual Activity    Alcohol use: No    Drug use: Never    Sexual activity: Not on file   Other Topics Concern    Not on file   Social History Narrative    Not on file     Social Drivers of Health     Financial Resource Strain: Not on file   Food Insecurity: Not on file   Transportation Needs: Not on file   Physical Activity: Not on file   Stress: Not on file   Social Connections: Not on file   Intimate Partner Violence: Not on file   Housing Stability: Not on file     Past Family History  Family History   Problem Relation Age of Onset    Thyroid Paternal Aunt         hyperthyroidism    Thyroid Paternal Aunt         hyperthyroidism    Thyroid Maternal Grandmother         goiter     Medication(s)    Current Outpatient Medications:     rosuvastatin, 5 mg, Oral, Q EVENING, Taking    hydrOXYzine HCl, 10 mg, Oral, Q8HRS PRN, Taking As Needed    losartan, 50 mg, Oral, DAILY, Taking    carvedilol, 3.125 mg, Oral, BID PRN, Taking As Needed    liothyronine, 10 mcg, Oral, DAILY, Taking    levothyroxine,  mcg, Oral, AM ES, Taking  Allergies  Methimazole    Review of Systems    A comprehensive 10 system review was conducted and is negative except as noted above in the HPI or here.      Vital Signs  /60 (BP Location: Left arm, Patient Position: Sitting, BP Cuff Size: Adult)   Pulse 80   Resp 16   Ht 1.473 m (4' 10\")   Wt 50.3 kg (111 lb)   SpO2 98%   BMI 23.20 kg/m²     Physical Exam  Vitals and nursing note reviewed.   Constitutional:       " "Appearance: Normal appearance.   HENT:      Head: Normocephalic and atraumatic.      Nose: Nose normal.      Mouth/Throat:      Mouth: Mucous membranes are moist.      Pharynx: Oropharynx is clear.   Eyes:      Pupils: Pupils are equal, round, and reactive to light.   Cardiovascular:      Rate and Rhythm: Normal rate and regular rhythm.      Heart sounds: No murmur heard.     No friction rub. No gallop.   Pulmonary:      Effort: Pulmonary effort is normal.      Breath sounds: Normal breath sounds.   Abdominal:      General: Bowel sounds are normal.      Palpations: Abdomen is soft.   Musculoskeletal:         General: Normal range of motion.      Cervical back: Normal range of motion and neck supple.   Skin:     General: Skin is warm and dry.   Neurological:      General: No focal deficit present.      Mental Status: She is alert and oriented to person, place, and time. Mental status is at baseline.   Psychiatric:         Mood and Affect: Mood normal.         Behavior: Behavior normal.         Thought Content: Thought content normal.         Judgment: Judgment normal.         Lab Results   Component Value Date/Time    TSHULTRASEN 0.010 (L) 01/02/2024 0210        Lab Results   Component Value Date/Time    FREET4 0.99 01/02/2024 0210      No results found for: \"HBA1C\"    Lab Results   Component Value Date/Time    CHOLSTRLTOT 203 (H) 09/23/2021 12:26 AM     (H) 09/23/2021 12:26 AM    HDL 52 09/23/2021 12:26 AM    TRIGLYCERIDE 123 09/23/2021 12:26 AM         Lab Results   Component Value Date/Time    SODIUM 139 04/20/2024 10:26 PM    POTASSIUM 3.8 04/20/2024 10:26 PM    CHLORIDE 104 04/20/2024 10:26 PM    CO2 22 04/20/2024 10:26 PM    GLUCOSE 114 (H) 04/20/2024 10:26 PM    BUN 22 04/20/2024 10:26 PM    CREATININE 1.14 04/20/2024 10:26 PM       Lab Results   Component Value Date/Time    ALKPHOSPHAT 59 04/20/2024 10:26 PM    ASTSGOT 22 04/20/2024 10:26 PM    ALTSGPT 15 04/20/2024 10:26 PM    TBILIRUBIN 0.3 " 04/20/2024 10:26 PM         Imaging  EKG demonstrates sinus rhythm.        Total patient time was estimated to be 45 minutes consisting of chart review, direct patient interaction, medication renewal, plan development and overall communication with the cardiovascular team.        Electronically signed by:   Julian Spence DO, MPH  [facility]    Portions of this note were completed using voice recognition software (Dragon Naturally speaking software) . Occasional transcription errors may have escaped proof reading. I have made every reasonable attempt to correct obvious errors, but I expect that there are errors of grammar and possibly content that I did not discover before finalizing the note.           [1]   Past Medical History:  Diagnosis Date    Hypertension     Thyroid disease    [2]   Past Surgical History:  Procedure Laterality Date    TUBAL COAGULATION LAPAROSCOPIC BILATERAL

## 2025-05-24 LAB — EKG IMPRESSION: NORMAL

## 2025-06-10 ENCOUNTER — NON-PROVIDER VISIT (OUTPATIENT)
Dept: CARDIOLOGY | Facility: MEDICAL CENTER | Age: 50
End: 2025-06-10
Attending: INTERNAL MEDICINE
Payer: COMMERCIAL

## 2025-06-10 NOTE — PROGRESS NOTES
Home enrollment completed in the 14 day Zio Holter monitor per Julian Spence MD.  Monitor will be shipped to patient via [employer]  Pending EOS

## 2025-07-07 ENCOUNTER — TELEPHONE (OUTPATIENT)
Dept: CARDIOLOGY | Facility: MEDICAL CENTER | Age: 50
End: 2025-07-07
Payer: COMMERCIAL